# Patient Record
Sex: FEMALE | Race: WHITE | Employment: OTHER | URBAN - METROPOLITAN AREA
[De-identification: names, ages, dates, MRNs, and addresses within clinical notes are randomized per-mention and may not be internally consistent; named-entity substitution may affect disease eponyms.]

---

## 2017-01-13 ENCOUNTER — OFFICE VISIT (OUTPATIENT)
Dept: CARDIOLOGY CLINIC | Age: 77
End: 2017-01-13

## 2017-01-13 VITALS
HEIGHT: 64 IN | SYSTOLIC BLOOD PRESSURE: 130 MMHG | HEART RATE: 73 BPM | BODY MASS INDEX: 21.17 KG/M2 | DIASTOLIC BLOOD PRESSURE: 62 MMHG | WEIGHT: 124 LBS | OXYGEN SATURATION: 96 %

## 2017-01-13 DIAGNOSIS — Z95.2 S/P MVR (MITRAL VALVE REPLACEMENT): ICD-10-CM

## 2017-01-13 DIAGNOSIS — I50.32 DIASTOLIC CHF, CHRONIC (HCC): ICD-10-CM

## 2017-01-13 DIAGNOSIS — I10 ESSENTIAL HYPERTENSION: ICD-10-CM

## 2017-01-13 DIAGNOSIS — I35.1 AORTIC VALVE INSUFFICIENCY, UNSPECIFIED ETIOLOGY: ICD-10-CM

## 2017-01-13 DIAGNOSIS — I09.9 CHRONIC RHEUMATIC HEART DISEASE: ICD-10-CM

## 2017-01-13 DIAGNOSIS — I44.2 COMPLETE HEART BLOCK (HCC): ICD-10-CM

## 2017-01-13 DIAGNOSIS — I48.0 PAROXYSMAL ATRIAL FIBRILLATION (HCC): Primary | ICD-10-CM

## 2017-01-13 DIAGNOSIS — I08.0 MITRAL VALVE STENOSIS AND AORTIC VALVE INSUFFICIENCY: ICD-10-CM

## 2017-01-13 RX ORDER — BUMETANIDE 2 MG/1
2 TABLET ORAL 2 TIMES DAILY
Qty: 180 TAB | Refills: 3 | Status: SHIPPED | OUTPATIENT
Start: 2017-01-13 | End: 2017-12-08 | Stop reason: ALTCHOICE

## 2017-01-13 RX ORDER — BUMETANIDE 2 MG/1
2 TABLET ORAL 2 TIMES DAILY
COMMUNITY
End: 2017-01-13 | Stop reason: SDUPTHER

## 2017-01-13 RX ORDER — WARFARIN 6 MG/1
6 TABLET ORAL DAILY
Qty: 135 TAB | Refills: 3 | Status: SHIPPED | OUTPATIENT
Start: 2017-01-13

## 2017-01-13 NOTE — PROGRESS NOTES
1. Have you been to the ER, urgent care clinic since your last visit? Hospitalized since your last visit? No     2. Have you seen or consulted any other health care providers outside of the [facility] since your last visit? Include any pap smears or colon screening.  No

## 2017-01-13 NOTE — PROGRESS NOTES
HISTORY OF PRESENT ILLNESS  Jaquelin Cardozo is a 78 y.o. female. Hypertension   Associated symptoms include shortness of breath. Pertinent negatives include no chest pain, no abdominal pain and no headaches. Shortness of Breath   Pertinent negatives include no fever, no headaches, no cough, no wheezing, no PND, no orthopnea, no chest pain, no vomiting, no abdominal pain, no rash, no leg swelling and no claudication. Patient presents for a follow-up office visit. Patient has a past medical history significant for chronic rheumatic heart disease, status post 2 prior [employer] mechanical prosthetic mitral valve replacements, most recently in 2003. During her last surgery, and non-coronary cusp of her aortic valve was inadvertently sutured open resulting in moderate to severe aortic insufficiency which has been present for the past 10 years or more. She also has a history of complete heart block following her first valve replacement, resulting in a dual-chamber permanent pacemaker. Through the years the patient has had paroxysmal atrial fibrillation, and remains on warfarin for anticoagulation for both her mechanical valve and the arrhythmia. Patient last underwent a followup echocardiogram in November 2016 which demonstrated low-normal LV systolic function, EF 39%, a normal functioning mechanical mitral valve prosthesis, moderate to severe aortic insufficiency and mild pulmonary hypertension. This was not significantly changed compared to her previous echocardiogram.    The patient was last seen in the office 6 weeks ago. At last visit, she was found to be in decompensated heart failure, and she was also in atrial fibrillation at that time albeit rate controlled. She was switched from Lasix 80 mg daily to Bumex 2 mg twice daily which did increase her diuresis which led to improve leg swelling and a 9 pound weight loss.   She reports that her shortness of breath has improved, although she still gets quite winded with any exertional activity. He denies any orthopnea or PND. No further leg swelling. She was also found to have a low potassium level with a K of 2.9, so was started on potassium replacement. Past Medical History   Diagnosis Date    AI (aortic insufficiency)      Moderate to severe    Atrial fibrillation (HCC)      on Coumadin    Cardiac echocardiogram 11/08/2016     EF 50%. No WMA. Mild LVH. Gr 2 DDfx. Mild RVE. RVSP 43 mmHg. Severe LAE. Mild ARTI. Prosthetic MV w/normal fx. Mod-severe AI (mean grad 16 mmHg; prev 9 mmHg). Mod TR.  Cardiac Holter monitoring 09/29/2011     Baseline sinus rhythm w/ventricular tracking. Occasional premature ventricular ectopic beats likely correspond w/symptoms.  Complete heart block (HCC)     COPD (chronic obstructive pulmonary disease) ([facility] [address]     HTN (hypertension)     Long term (current) use of anticoagulants 8/19/2011    Mitral valve stenosis and aortic valve insufficiency      status post 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  Pacemaker      Medtronic dual-chamber       Current Outpatient Prescriptions   Medication Sig Dispense Refill    bumetanide (BUMEX) 2 mg tablet Take 1 Tab by mouth two (2) times a day. 180 Tab 3    warfarin (COUMADIN) 6 mg tablet Take 1 Tab by mouth daily. or as directed 135 Tab 3    potassium chloride SR (K-TAB) 20 mEq tablet Take 2 Tabs by mouth two (2) times a day. 360 Tab 3    metoprolol succinate (TOPROL XL) 100 mg tablet Take 1 Tab by mouth daily. 90 Tab 3    amLODIPine (NORVASC) 5 mg tablet Take 1 Tab by mouth daily. 90 Tab 3    albuterol (PROVENTIL HFA) 90 mcg/actuation inhaler Take 1 puff by inhalation every four (4) hours as needed. 3 Inhaler 3    aspirin delayed-release 81 mg tablet Take 1 Tab by mouth daily. 90 Tab 3    vitamin E (AQUA GEMS) 400 unit capsule Take 400 Units by mouth daily. No Known Allergies     Social History   Substance Use Topics    Smoking status: Former Smoker     Packs/day: 0.50     Years: 30.00     Quit date: 8/19/1999    Smokeless tobacco: Never Used    Alcohol use No             Review of Systems   Constitutional: Positive for malaise/fatigue. Negative for chills, fever and weight loss. HENT: Negative for nosebleeds. Eyes: Negative for blurred vision and double vision. Respiratory: Positive for shortness of breath. Negative for cough and wheezing. Cardiovascular: Negative for chest pain, palpitations, orthopnea, claudication, leg swelling and PND. Gastrointestinal: Negative for abdominal pain, heartburn, nausea and vomiting. Genitourinary: Negative for dysuria and hematuria. Musculoskeletal: Negative for falls and myalgias. Skin: Negative for rash. Neurological: Negative for dizziness, focal weakness and headaches. Endo/Heme/Allergies: Does not bruise/bleed easily. Psychiatric/Behavioral: Negative for substance abuse. Visit Vitals    /62    Pulse 73    Ht 5' 4\" (1.626 m)    Wt 56.2 kg (124 lb)    SpO2 96%    BMI 21.28 kg/m2      Physical Exam   Constitutional: She is oriented to person, place, and time. She appears well-developed and well-nourished. HENT:   Head: Normocephalic and atraumatic. Eyes: Conjunctivae are normal.   Neck: Neck supple. No JVD present. Carotid bruit is not present. Cardiovascular: Normal rate, regular rhythm, S1 normal, S2 normal and normal pulses. Exam reveals no gallop. Murmur heard. Midsystolic murmur is present with a grade of 2/6  at the upper right sternal border  High-pitched blowing decrescendo early diastolic murmur is present with a grade of 2/6  at the upper right sternal border radiating to the apex  Pulmonary/Chest: Effort normal. She has decreased breath sounds. She has no wheezes. She has no rales. Abdominal: Soft. Bowel sounds are normal. There is no tenderness. Musculoskeletal: She exhibits no edema. Neurological: She is alert and oriented to person, place, and time. Skin: Skin is warm and dry. EKG:  Atrial fibrillation, 100% ventricular pacing. Compared to the previous EKG, no significant interval change. Pacemaker interrogation. Battery at beginning of life. Estimated longevity at 7 years. Patient in the ventricle 96 %, persistent atrial fibrillation since November 27, 2016. No high rate episodes. Scanned document for details. ASSESSMENT and PLAN    Chronic diastolic heart failure. Patient's volume status appears to be much better today. Her weight is down 9 pounds. Her leg swelling has resolved. I would continue her current diuretic regimen which includes Bumex 2 mg twice daily. A repeat Chem-7 panel and magnesium level be checked. Her blood pressure appears to be optimally controlled on the current medical regimen. Rheumatic valvular heart disease. Status post mechanical [facility]'s, prosthetic mitral valve replacement x 2, the last surgery in 2003. She also has residual aortic valve disease with mild stenosis and moderate to severe aortic insufficiency. A repeat echocardiogram in November 2016 demonstrated a normal functioning mitral valve mechanical prosthesis and moderate to severe aortic insufficiency. EF 50%. This was essentially unchanged compared to the year prior she remains on warfarin for anticoagulation. Her goal INR is 3.0. Paroxysmal atrial fibrillation. The patient remains in atrial fibrillation for the past 6-7 weeks, and I suspect this is contributing to her diastolic heart failure. She has never had any high rate episodes. She is relatively asymptomatic to the arrhythmias. She is on therapeutic warfarin. Given her enlarged left atrium and prior mitral valve surgery, is unlikely that she will remain in sinus rhythm much in the future. Complete heart block. This occurs on her initial valve surgery.   She is now pacemaker dependent in the ventricle. Her current pacemaker generator is at beginning of life. Normal device function on interrogation today. Hypertension. Patient blood pressure is well controlled on her regimen which includes metoprolol and amlodipine. Dyslipidemia. Her most recent lipid panel from March 2015 showed an elevated total cholesterol at 230, , HDL 50. She would likely benefit from starting on a statin. The patient is reluctant to start a statin at this time, recommended repeat lipid panel in of her LDL remains greater than 140, I would recommend starting atorvastatin 20 mg daily. follow-up in 3 months, sooner if needed.

## 2017-01-13 NOTE — MR AVS SNAPSHOT
Visit Information Date & Time Provider Department Dept. Phone Encounter #  
 1/13/2017 11:20 AM Elisabeth Bourgeois MD [facility] [address] [id number] Upcoming Health Maintenance Date Due DTaP/Tdap/Td series (1 - Tdap) 1/2/1963 FOBT Q 1 YEAR AGE 50-75 1/2/1992 ZOSTER VACCINE AGE 60> 1/2/2002 GLAUCOMA SCREENING Q2Y 1/2/2007 OSTEOPOROSIS SCREENING (DEXA) 1/2/2007 Pneumococcal 65+ Low/Medium Risk (1 of 2 - PCV13) 1/2/2007 MEDICARE YEARLY EXAM 1/2/2007 INFLUENZA AGE 9 TO ADULT 8/1/2016 Allergies as of 1/13/2017  Review Complete On: 11/30/2016 By: Elisabeth Bourgeois MD  
 No Known Allergies Current Immunizations  Never Reviewed No immunizations on file. Not reviewed this visit You Were Diagnosed With   
  
 Codes Comments Complete heart block (HCC)    -  Primary ICD-10-CM: G10.8 ICD-9-CM: 426.0 Aortic valve insufficiency, unspecified etiology     ICD-10-CM: I35.1 ICD-9-CM: 424.1 Essential hypertension     ICD-10-CM: I10 
ICD-9-CM: 401.9 Paroxysmal atrial fibrillation (HCC)     ICD-10-CM: I48.0 ICD-9-CM: 427.31 Mitral valve stenosis and aortic valve insufficiency     ICD-10-CM: I08.0 ICD-9-CM: 396.1 Vitals BP Pulse Height(growth percentile) Weight(growth percentile) SpO2 BMI  
 130/62 73 5' 4\" (1.626 m) 124 lb (56.2 kg) 96% 21.28 kg/m2 OB Status Smoking Status Postmenopausal Former Smoker Vitals History BMI and BSA Data Body Mass Index Body Surface Area  
 21.28 kg/m 2 1.59 m 2 [facility] [address] [phone] Your Updated Medication List  
  
   
This list is accurate as of: 1/13/17 12:38 PM.  Always use your most recent med list.  
  
  
  
  
 albuterol 90 mcg/actuation inhaler Commonly known as:  PROVENTIL HFA  
 Take 1 puff by inhalation every four (4) hours as needed. amLODIPine 5 mg tablet Commonly known as:  Lorraine Bain Take 1 Tab by mouth daily. aspirin delayed-release 81 mg tablet Take 1 Tab by mouth daily. bumetanide 2 mg tablet Commonly known as:  Marcheta Mocha Take 1 Tab by mouth two (2) times a day. metoprolol succinate 100 mg tablet Commonly known as:  TOPROL XL Take 1 Tab by mouth daily. potassium chloride SR 20 mEq tablet Commonly known as:  K-TAB Take 2 Tabs by mouth two (2) times a day. vitamin E 400 unit capsule Commonly known as:  [address] Take 400 Units by mouth daily. warfarin 6 mg tablet Commonly known as:  COUMADIN Take 1 Tab by mouth daily. or as directed Prescriptions Printed Refills  
 bumetanide (BUMEX) 2 mg tablet 3 Sig: Take 1 Tab by mouth two (2) times a day. Class: Print Route: Oral  
  
We Performed the Following AMB POC EKG ROUTINE W/ 12 LEADS, INTER & REP [02595 CPT(R)] To-Do List   
 01/13/2017 Lab:  MAGNESIUM   
  
 01/13/2017 Lab:  METABOLIC PANEL, BASIC Introducing [facility] SERVICES! Dear Killian Santos: 
Thank you for requesting a [employer] account. Our records indicate that you already have an active [employer] account. You can access your account anytime at [email or web address]". [employer]/"[employer]" Did you know that you can access your hospital and ER discharge instructions at any time in [employer]? You can also review all of your test results from your hospital stay or ER visit. Additional Information If you have questions, please visit the Frequently Asked Questions section of the [employer] website at [email or web address]/"[employer]"/. Remember, [employer] is NOT to be used for urgent needs. For medical emergencies, dial 911. Now available from your iPhone and Android! Please provide this summary of care documentation to your next provider. Your primary care clinician is listed as NONE. If you have any questions after today's visit, please call [phone].

## 2017-01-30 LAB — INR, EXTERNAL: 2.4

## 2017-01-31 ENCOUNTER — TELEPHONE ANTICOAG (OUTPATIENT)
Dept: CARDIOLOGY CLINIC | Age: 77
End: 2017-01-31

## 2017-01-31 DIAGNOSIS — I08.0 MITRAL VALVE STENOSIS AND AORTIC VALVE INSUFFICIENCY: ICD-10-CM

## 2017-01-31 DIAGNOSIS — Z79.01 LONG TERM (CURRENT) USE OF ANTICOAGULANTS: ICD-10-CM

## 2017-01-31 NOTE — PROGRESS NOTES
Verbal order and read back per Stacey Tobias NP   The INR is below the therapeutic range.   Please make the following adjustments to Coumadin dosing: Take 6 mg X 2 then Coumadin to 6mg daily except 3mg on Mon, Wed, and Fri  Repeat the INR in 2 weeks  Patient informed of instructions, read back and verbalized understanding Pamela Diego LPN

## 2017-03-01 ENCOUNTER — TELEPHONE ANTICOAG (OUTPATIENT)
Dept: CARDIOLOGY CLINIC | Age: 77
End: 2017-03-01

## 2017-03-01 DIAGNOSIS — Z79.01 LONG TERM (CURRENT) USE OF ANTICOAGULANTS: ICD-10-CM

## 2017-03-01 DIAGNOSIS — I08.0 MITRAL VALVE STENOSIS AND AORTIC VALVE INSUFFICIENCY: ICD-10-CM

## 2017-03-01 LAB — INR, EXTERNAL: 1.3

## 2017-03-01 NOTE — PROGRESS NOTES
Verbal order and read back per Darrius Peterson NP   The INR is below the therapeutic range. Please make the following adjustments to Coumadin dosing: Take 9 mg X 1 then change Coumadin to 6mg daily except 3mg on Mon and Fri. Recheck in 1 week  Repeat the INR in 1 week.   Patient informed of instructions, read back and verbalized understanding Kaushik Valentin LPN

## 2017-03-21 ENCOUNTER — TELEPHONE ANTICOAG (OUTPATIENT)
Dept: CARDIOLOGY CLINIC | Age: 77
End: 2017-03-21

## 2017-03-21 DIAGNOSIS — I08.0 MITRAL VALVE STENOSIS AND AORTIC VALVE INSUFFICIENCY: ICD-10-CM

## 2017-03-21 DIAGNOSIS — Z79.01 LONG TERM (CURRENT) USE OF ANTICOAGULANTS: ICD-10-CM

## 2017-03-21 LAB — INR, EXTERNAL: 4.3

## 2017-03-21 NOTE — PROGRESS NOTES
Verbal order and read back per Fidencio Wei NP  The INR is above the therapeutic range. Ask the patient about bleeding complications. Please make the following adjustments to Coumadin dosing: Hold X 1 then change Coumadin to 6mg daily except 3mg on Mon, Wed, and Fri. Recheck in 1 week   Repeat the INR in 1 week.   Patient informed of instructions, read back and verbalized understanding Dianelys Moore LPN

## 2017-04-18 LAB — INR, EXTERNAL: 2.5

## 2017-04-19 ENCOUNTER — TELEPHONE ANTICOAG (OUTPATIENT)
Dept: CARDIOLOGY CLINIC | Age: 77
End: 2017-04-19

## 2017-04-19 DIAGNOSIS — Z79.01 LONG TERM (CURRENT) USE OF ANTICOAGULANTS: ICD-10-CM

## 2017-04-19 DIAGNOSIS — I08.0 MITRAL VALVE STENOSIS AND AORTIC VALVE INSUFFICIENCY: ICD-10-CM

## 2017-04-19 NOTE — PROGRESS NOTES
Verbal order and read back per Adrienne Riojas NP  The INR is stable and therapeutic. Continue same dose of coumadin and recheck in 1 month. Continue Coumadin 6mg daily except 3mg on Mon and Fri.  (Patient states she has been taking this dose)  Loan Noonan LPN

## 2017-05-15 LAB — INR, EXTERNAL: 2.2

## 2017-05-16 ENCOUNTER — TELEPHONE ANTICOAG (OUTPATIENT)
Dept: CARDIOLOGY CLINIC | Age: 77
End: 2017-05-16

## 2017-05-16 DIAGNOSIS — Z79.01 LONG TERM (CURRENT) USE OF ANTICOAGULANTS: ICD-10-CM

## 2017-05-16 DIAGNOSIS — I08.0 MITRAL VALVE STENOSIS AND AORTIC VALVE INSUFFICIENCY: ICD-10-CM

## 2017-05-16 NOTE — PROGRESS NOTES
Verbal order and read back per Tree Rader NP   The INR is below the therapeutic range. Please make the following adjustments to Coumadin dosing:Take 9 mg X 1 then continue Coumadin 6 mg daily except 3mg on Mon, Wed,  Fri. .  Repeat the INR in 2 weeks.   Patient informed of instructions, read back and verbalized understanding Maximo Evans LPN

## 2017-06-01 ENCOUNTER — OFFICE VISIT (OUTPATIENT)
Dept: CARDIOLOGY CLINIC | Age: 77
End: 2017-06-01

## 2017-06-01 VITALS
HEART RATE: 76 BPM | WEIGHT: 120 LBS | HEIGHT: 64 IN | SYSTOLIC BLOOD PRESSURE: 122 MMHG | BODY MASS INDEX: 20.49 KG/M2 | DIASTOLIC BLOOD PRESSURE: 60 MMHG | OXYGEN SATURATION: 98 %

## 2017-06-01 DIAGNOSIS — I44.2 COMPLETE HEART BLOCK (HCC): ICD-10-CM

## 2017-06-01 DIAGNOSIS — E78.5 DYSLIPIDEMIA: ICD-10-CM

## 2017-06-01 DIAGNOSIS — I50.32 DIASTOLIC CHF, CHRONIC (HCC): ICD-10-CM

## 2017-06-01 DIAGNOSIS — Z95.2 S/P MVR (MITRAL VALVE REPLACEMENT): ICD-10-CM

## 2017-06-01 DIAGNOSIS — I10 ESSENTIAL HYPERTENSION: ICD-10-CM

## 2017-06-01 DIAGNOSIS — I08.0 MITRAL VALVE STENOSIS AND AORTIC VALVE INSUFFICIENCY: Primary | ICD-10-CM

## 2017-06-01 DIAGNOSIS — Z79.01 LONG TERM (CURRENT) USE OF ANTICOAGULANTS: ICD-10-CM

## 2017-06-01 DIAGNOSIS — I48.0 PAROXYSMAL ATRIAL FIBRILLATION (HCC): ICD-10-CM

## 2017-06-01 NOTE — MR AVS SNAPSHOT
Visit Information Date & Time Provider Department Dept. Phone Encounter #  
 6/1/2017  1:00 PM Earl Hernandez MD [facility] [address] [id number] Upcoming Health Maintenance Date Due DTaP/Tdap/Td series (1 - Tdap) 1/2/1963 ZOSTER VACCINE AGE 60> 1/2/2002 GLAUCOMA SCREENING Q2Y 1/2/2007 OSTEOPOROSIS SCREENING (DEXA) 1/2/2007 Pneumococcal 65+ Low/Medium Risk (1 of 2 - PCV13) 1/2/2007 MEDICARE YEARLY EXAM 1/2/2007 INFLUENZA AGE 9 TO ADULT 8/1/2017 Allergies as of 6/1/2017  Review Complete On: 6/1/2017 By: Ivett Galindo No Known Allergies Current Immunizations  Never Reviewed No immunizations on file. Not reviewed this visit You Were Diagnosed With   
  
 Codes Comments Chronic systolic heart failure (HCC)    -  Primary ICD-10-CM: P90.27 ICD-9-CM: 428.22 Mitral valve stenosis and aortic valve insufficiency     ICD-10-CM: I08.0 ICD-9-CM: 396.1 Vitals BP Pulse Height(growth percentile) Weight(growth percentile) SpO2 BMI  
 122/60 76 5' 4\" (1.626 m) 120 lb (54.4 kg) 98% 20.6 kg/m2 OB Status Smoking Status Postmenopausal Former Smoker Vitals History BMI and BSA Data Body Mass Index Body Surface Area  
 20.6 kg/m 2 1.57 m 2 [facility] [address] [phone] Your Updated Medication List  
  
   
This list is accurate as of: 6/1/17  1:42 PM.  Always use your most recent med list.  
  
  
  
  
 albuterol 90 mcg/actuation inhaler Commonly known as:  PROVENTIL HFA Take 1 puff by inhalation every four (4) hours as needed. amLODIPine 5 mg tablet Commonly known as:  Giv[address] Hansel Take 1 Tab by mouth daily. aspirin delayed-release 81 mg tablet Take 1 Tab by mouth daily. bumetanide 2 mg tablet Commonly known as:  Dre Mitchell Take 1 Tab by mouth two (2) times a day. metoprolol succinate 100 mg tablet Commonly known as:  TOPROL XL Take 1 Tab by mouth daily. potassium chloride SR 20 mEq tablet Commonly known as:  K-TAB Take 2 Tabs by mouth two (2) times a day. vitamin E 400 unit capsule Commonly known as:  [address] Take 400 Units by mouth daily. warfarin 6 mg tablet Commonly known as:  COUMADIN Take 1 Tab by mouth daily. or as directed We Performed the Following AMB POC EKG ROUTINE W/ 12 LEADS, INTER & REP [11642 CPT(R)] To-Do List   
 11/01/2017 ECHO:  2D ECHO COMPLETE ADULT (TTE) W OR WO CONTR   
  
 11/07/2017 1:00 PM  
  Appointment with [facility] [address] [facility] CV SERV at [facility] NON-INVASIVE [address] [place] [address] ([phone]) Patient needs to bring a current list of all medications  No preparation is required for this study  This study requires patient to bring a written physicians order or the MD office may fax the order to Central Scheduling at [phone]. This exam is performed in the [address] [place] [address] at [facility] in the echo department. Please have the patient arrive 15 minutes prior to their schedule appointment time and report to Patient Registration at the main entrance of the hospital.     AGE LIMIT for this procedure at [facility] is 25years old. All patients under 25years of age should be referred to [facility]. Patient Instructions Increase coumadin to 6 mg daily except 3 mg daily Introducing [facility] HE[facility] SERVICES! Dear Andres Perez: 
Thank you for requesting a [employer] account. Our records indicate that you already have an active [employer] account. You can access your account anytime at [email or web address] Did you know that you can access your hospital and ER discharge instructions at any time in [employer]? You can also review all of your test results from your hospital stay or ER visit. Additional Information If you have questions, please visit the Frequently Asked Questions section of the [employer] website at [email or web address] Remember, [employer] is NOT to be used for urgent needs. For medical emergencies, dial 911. Now available from your iPhone and Android! Please provide this summary of care documentation to your next provider. Your primary care clinician is listed as NONE. If you have any questions after today's visit, please call [phone].

## 2017-06-01 NOTE — PROGRESS NOTES
HISTORY OF PRESENT ILLNESS  Moon Barron is a 78 y.o. female. Hypertension   Associated symptoms include shortness of breath. Pertinent negatives include no chest pain, no abdominal pain and no headaches. Shortness of Breath   Pertinent negatives include no fever, no headaches, no cough, no wheezing, no PND, no orthopnea, no chest pain, no vomiting, no abdominal pain, no rash, no leg swelling and no claudication. Patient presents for a follow-up office visit. Patient has a past medical history significant for chronic rheumatic heart disease, status post 2 prior [employer] mechanical prosthetic mitral valve replacements, most recently in 2003. During her last surgery, and non-coronary cusp of her aortic valve was inadvertently sutured open resulting in moderate to severe aortic insufficiency which has been present for the past 10 years or more. She also has a history of complete heart block following her first valve replacement, resulting in a dual-chamber permanent pacemaker. Through the years the patient has had paroxysmal atrial fibrillation, and remains on warfarin for anticoagulation for both her mechanical valve and the arrhythmia. Patient last underwent a followup echocardiogram in November 2016 which demonstrated low-normal LV systolic function, EF 78%, a normal functioning mechanical mitral valve prosthesis, moderate to severe aortic insufficiency and mild pulmonary hypertension. This was not significantly changed compared to her previous echocardiogram.    At last visit, the patient was switched from Lasix to Bumex which did help with her volume overload and heart failure symptoms. She was last seen in the office approximately 4-5 months ago. Since that time, she states she lost another 4-5 pounds in weight. Her shortness of breath has improved as has her activity tolerance. She denies any leg swelling, no orthopnea, no PND. No palpitations, dizziness or dutch syncope.   She checks her INR at home the last 2 readings have been 2.2 and 2.1 respectively over the past 2 weeks. Past Medical History:   Diagnosis Date    AI (aortic insufficiency)     Moderate to severe    Atrial fibrillation (HCC)     on Coumadin    Cardiac echocardiogram 11/08/2016    EF 50%. No WMA. Mild LVH. Gr 2 DDfx. Mild RVE. RVSP 43 mmHg. Severe LAE. Mild ARTI. Prosthetic MV w/normal fx. Mod-severe AI (mean grad 16 mmHg; prev 9 mmHg). Mod TR.  Cardiac Holter monitoring 09/29/2011    Baseline sinus rhythm w/ventricular tracking. Occasional premature ventricular ectopic beats likely correspond w/symptoms.  Complete heart block (HCC)     COPD (chronic obstructive pulmonary disease) ([facility] [address]     HTN (hypertension)     Long term (current) use of anticoagulants 8/19/2011    Mitral valve stenosis and aortic valve insufficiency     status post 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  Pacemaker     Medtronic dual-chamber       Current Outpatient Prescriptions   Medication Sig Dispense Refill    bumetanide (BUMEX) 2 mg tablet Take 1 Tab by mouth two (2) times a day. 180 Tab 3    warfarin (COUMADIN) 6 mg tablet Take 1 Tab by mouth daily. or as directed 135 Tab 3    potassium chloride SR (K-TAB) 20 mEq tablet Take 2 Tabs by mouth two (2) times a day. 360 Tab 3    metoprolol succinate (TOPROL XL) 100 mg tablet Take 1 Tab by mouth daily. 90 Tab 3    amLODIPine (NORVASC) 5 mg tablet Take 1 Tab by mouth daily. 90 Tab 3    albuterol (PROVENTIL HFA) 90 mcg/actuation inhaler Take 1 puff by inhalation every four (4) hours as needed. 3 Inhaler 3    aspirin delayed-release 81 mg tablet Take 1 Tab by mouth daily. 90 Tab 3    vitamin E (AQUA GEMS) 400 unit capsule Take 400 Units by mouth daily.          No Known Allergies     Social History   Substance Use Topics    Smoking status: Former Smoker     Packs/day: 0.50 Years: 30.00     Quit date: 8/19/1999    Smokeless tobacco: Never Used    Alcohol use No             Review of Systems   Constitutional: Negative for chills, fever and weight loss. HENT: Negative for nosebleeds. Eyes: Negative for blurred vision and double vision. Respiratory: Positive for shortness of breath. Negative for cough and wheezing. Cardiovascular: Negative for chest pain, palpitations, orthopnea, claudication, leg swelling and PND. Gastrointestinal: Negative for abdominal pain, heartburn, nausea and vomiting. Genitourinary: Negative for dysuria and hematuria. Musculoskeletal: Negative for falls and myalgias. Skin: Negative for rash. Neurological: Negative for dizziness, focal weakness and headaches. Endo/Heme/Allergies: Does not bruise/bleed easily. Psychiatric/Behavioral: Negative for substance abuse. Visit Vitals    /60    Pulse 76    Ht 5' 4\" (1.626 m)    Wt 54.4 kg (120 lb)    SpO2 98%    BMI 20.6 kg/m2      Physical Exam   Constitutional: She is oriented to person, place, and time. She appears well-developed and well-nourished. HENT:   Head: Normocephalic and atraumatic. Eyes: Conjunctivae are normal.   Neck: Neck supple. No JVD present. Carotid bruit is not present. Cardiovascular: Normal rate, regular rhythm, S1 normal, S2 normal and normal pulses. Exam reveals no gallop. Murmur heard. Midsystolic murmur is present with a grade of 2/6  at the upper right sternal border  High-pitched blowing decrescendo early diastolic murmur is present with a grade of 2/6  at the upper right sternal border radiating to the apex  Pulmonary/Chest: Effort normal. She has decreased breath sounds. She has no wheezes. She has no rales. Abdominal: Soft. Bowel sounds are normal. There is no tenderness. Musculoskeletal: She exhibits no edema. Neurological: She is alert and oriented to person, place, and time. Skin: Skin is warm and dry.      EKG:  Atrial fibrillation, 100% ventricular pacing. Compared to the previous EKG, no significant interval change. Pacemaker interrogation. Battery at beginning of life. Estimated longevity at 6 years. Patient in the ventricle 97 %, persistent atrial fibrillation since November 2016. No high rate episodes. Scanned document for details. ASSESSMENT and PLAN    Chronic diastolic heart failure. Patient's volume status remained stable. Her weight is down another 4-5 pounds today. I would continue her current diuretic regimen which includes Bumex 2 mg twice daily. Her blood pressure remains optimally controlled on the current medical regimen. Rheumatic valvular heart disease. Status post mechanical [facility], prosthetic mitral valve replacement x 2, the last surgery in 2003. She also has residual aortic valve disease with mild stenosis and moderate to severe aortic insufficiency. A repeat echocardiogram in November 2016 demonstrated a normal functioning mitral valve mechanical prosthesis and moderate to severe aortic insufficiency. EF 50%. This was essentially unchanged compared to the year prior she remains on warfarin for anticoagulation. Her goal INR is 3.0. I would like to adjust her warfarin dosing since her last 2 INR readings have been subtherapeutic. I recommended that she take warfarin 6 mg 6 days a week except for Wednesday when she should take 3 mg. She should check a follow-up INR in 1 week and call our office with the results. Paroxysmal atrial fibrillation. The patient has remained in atrial fibrillation for the past 6-7 months she has never had any high rate episodes. She is relatively asymptomatic to the arrhythmias. She is on therapeutic warfarin. Given her enlarged left atrium and prior mitral valve surgery, is unlikely that she will remain in sinus rhythm much in the future. For now I would continue her current beta-blocker dosage. Complete heart block.   This occurs on her initial valve surgery. She is now pacemaker dependent in the ventricle. Her current pacemaker generator is at beginning of life. Normal device function on interrogation today. Hypertension. Patient blood pressure is well controlled on her regimen which includes metoprolol and amlodipine. Dyslipidemia. Her most recent lipid panel from March 2015 showed an elevated total cholesterol at 230, , HDL 50. She would likely benefit from starting on a statin. The patient is reluctant to start a statin at this time. follow-up in 6 months, sooner if needed.

## 2017-06-01 NOTE — PROGRESS NOTES
1. Have you been to the ER, urgent care clinic since your last visit? Hospitalized since your last visit? no  2. Have you seen or consulted any other health care providers outside of the [address] [place] [address] since your last visit? Include any pap smears or colon screening.   no

## 2017-06-14 ENCOUNTER — TELEPHONE ANTICOAG (OUTPATIENT)
Dept: CARDIOLOGY CLINIC | Age: 77
End: 2017-06-14

## 2017-06-14 DIAGNOSIS — I08.0 MITRAL VALVE STENOSIS AND AORTIC VALVE INSUFFICIENCY: ICD-10-CM

## 2017-06-14 DIAGNOSIS — Z79.01 LONG TERM (CURRENT) USE OF ANTICOAGULANTS: ICD-10-CM

## 2017-06-14 LAB — INR, EXTERNAL: 1.4

## 2017-06-14 NOTE — PROGRESS NOTES
Verbal order and read back per Kartik Partida NP  The INR is below the therapeutic range. Please make the following adjustments to Coumadin dosing: Take 9 mg X 1 then increase Coumadin to 6 mg daily  (Patient states she was taking 6 mg daily except 3 mg on Wed)  Repeat the INR in 1 week.   Patient informed of instructions, read back and verbalized understanding Nilesh Parnell LPN

## 2017-06-21 LAB — INR, EXTERNAL: 2.3

## 2017-06-22 ENCOUNTER — TELEPHONE ANTICOAG (OUTPATIENT)
Dept: CARDIOLOGY CLINIC | Age: 77
End: 2017-06-22

## 2017-06-22 DIAGNOSIS — I08.0 MITRAL VALVE STENOSIS AND AORTIC VALVE INSUFFICIENCY: ICD-10-CM

## 2017-06-22 DIAGNOSIS — Z79.01 LONG TERM (CURRENT) USE OF ANTICOAGULANTS: ICD-10-CM

## 2017-06-22 RX ORDER — AMLODIPINE BESYLATE 5 MG/1
5 TABLET ORAL DAILY
Qty: 90 TAB | Refills: 3 | Status: SHIPPED | OUTPATIENT
Start: 2017-06-22

## 2017-06-22 NOTE — PROGRESS NOTES
Verbal order and read back per Ramirez No, NP  The INR is below the therapeutic range. Please make the following adjustments to Coumadin dosing: Take 9 mg X 1 then continue Coumadin 6 mg daily   Repeat the INR in 1 week.   Patient informed of instructions, read back and verbalized understanding Alpa Olivas LPN

## 2017-07-03 LAB — INR, EXTERNAL: 4.7

## 2017-07-05 ENCOUNTER — TELEPHONE ANTICOAG (OUTPATIENT)
Dept: CARDIOLOGY CLINIC | Age: 77
End: 2017-07-05

## 2017-07-05 DIAGNOSIS — I08.0 MITRAL VALVE STENOSIS AND AORTIC VALVE INSUFFICIENCY: ICD-10-CM

## 2017-07-05 DIAGNOSIS — Z79.01 LONG TERM (CURRENT) USE OF ANTICOAGULANTS: ICD-10-CM

## 2017-07-05 NOTE — PROGRESS NOTES
Verbal order and read back per Radha Current, NP  The INR is above the therapeutic range. Ask the patient about bleeding complications. Please make the following adjustments to Coumadin dosing: Hold X 1 then continue Coumadin 6 mg daily.  Recheck on Monday  Patient informed of instructions, read back and verbalized understanding Rian Jo LPN

## 2017-07-11 LAB — INR, EXTERNAL: 2.5

## 2017-07-13 ENCOUNTER — TELEPHONE ANTICOAG (OUTPATIENT)
Dept: CARDIOLOGY CLINIC | Age: 77
End: 2017-07-13

## 2017-07-13 DIAGNOSIS — Z79.01 LONG TERM (CURRENT) USE OF ANTICOAGULANTS: ICD-10-CM

## 2017-07-13 DIAGNOSIS — I08.0 MITRAL VALVE STENOSIS AND AORTIC VALVE INSUFFICIENCY: ICD-10-CM

## 2017-07-13 NOTE — PROGRESS NOTES
Verbal order and read back per Naima Dupont MD  The INR is stable and therapeutic. Continue same dose of coumadin and recheck in 1 week  Continue Coumadin 6 mg daily.    Patient informed of instructions, read back and verbalized understanding Karthikeyan Forutne LPN

## 2017-07-20 ENCOUNTER — TELEPHONE ANTICOAG (OUTPATIENT)
Dept: CARDIOLOGY CLINIC | Age: 77
End: 2017-07-20

## 2017-07-20 DIAGNOSIS — Z79.01 LONG TERM (CURRENT) USE OF ANTICOAGULANTS: ICD-10-CM

## 2017-07-20 DIAGNOSIS — I08.0 MITRAL VALVE STENOSIS AND AORTIC VALVE INSUFFICIENCY: ICD-10-CM

## 2017-07-20 LAB — INR, EXTERNAL: 2.7

## 2017-07-20 NOTE — PROGRESS NOTES
Verbal order and read back per Betzaida Campbell NP  The INR is stable and therapeutic. Continue same dose of coumadin and recheck in 2 weeks  Continue Coumadin 6 mg daily.    Patient informed of instructions, read back and verbalized understanding Octavia Tejada LPN

## 2017-08-09 LAB — INR, EXTERNAL: 3

## 2017-08-10 ENCOUNTER — TELEPHONE ANTICOAG (OUTPATIENT)
Dept: CARDIOLOGY CLINIC | Age: 77
End: 2017-08-10

## 2017-08-10 DIAGNOSIS — I08.0 MITRAL VALVE STENOSIS AND AORTIC VALVE INSUFFICIENCY: ICD-10-CM

## 2017-08-10 DIAGNOSIS — Z79.01 LONG TERM (CURRENT) USE OF ANTICOAGULANTS: ICD-10-CM

## 2017-08-10 NOTE — PROGRESS NOTES
Verbal order and read back per Karmen Junior, NP  The INR is stable and therapeutic. Continue same dose of coumadin and recheck in 3 weeks  Continue Coumadin 6 mg daily.    Patient informed of instructions, read back and verbalized understanding Alondra Trammell LPN

## 2017-09-05 LAB — INR, EXTERNAL: 3.6

## 2017-09-06 ENCOUNTER — TELEPHONE ANTICOAG (OUTPATIENT)
Dept: CARDIOLOGY CLINIC | Age: 77
End: 2017-09-06

## 2017-09-06 DIAGNOSIS — I08.0 MITRAL VALVE STENOSIS AND AORTIC VALVE INSUFFICIENCY: ICD-10-CM

## 2017-09-06 DIAGNOSIS — Z79.01 LONG TERM (CURRENT) USE OF ANTICOAGULANTS: ICD-10-CM

## 2017-09-06 NOTE — PROGRESS NOTES
Verbal order and read back per Lesli Colon NP  The INR is above the therapeutic range. Ask the patient about bleeding complications. Please make the following adjustments to Coumadin dosing: Take 3 mg X 1 then Continue Coumadin 6 mg daily. Repeat the INR in 2 weeks.   Patient informed of instructions, read back and verbalized understanding Lou Cline LPN

## 2017-09-21 LAB — INR, EXTERNAL: 3.7

## 2017-09-22 ENCOUNTER — TELEPHONE ANTICOAG (OUTPATIENT)
Dept: CARDIOLOGY CLINIC | Age: 77
End: 2017-09-22

## 2017-09-22 DIAGNOSIS — Z79.01 LONG TERM (CURRENT) USE OF ANTICOAGULANTS: ICD-10-CM

## 2017-09-22 DIAGNOSIS — I08.0 MITRAL VALVE STENOSIS AND AORTIC VALVE INSUFFICIENCY: ICD-10-CM

## 2017-09-22 NOTE — PROGRESS NOTES
Verbal order and read back per Susanne Dee NP  The INR is above the therapeutic range. Ask the patient about bleeding complications. Please make the following adjustments to Coumadin dosing:Take 3 mg X 1 then decrease Coumadin to 6 mg daily except 3 mg on  Monday  Repeat the INR in 2 weeks.   Patient informed of instructions, read back and verbalized understanding Arnaud Rodriguez LPN

## 2017-10-11 LAB — INR, EXTERNAL: 2.7

## 2017-10-16 ENCOUNTER — TELEPHONE ANTICOAG (OUTPATIENT)
Dept: CARDIOLOGY CLINIC | Age: 77
End: 2017-10-16

## 2017-10-16 DIAGNOSIS — I08.0 MITRAL VALVE STENOSIS AND AORTIC VALVE INSUFFICIENCY: ICD-10-CM

## 2017-10-16 NOTE — PROGRESS NOTES
Verbal order and read back per Clari Ash NP  The INR is stable and therapeutic.  Continue same dose of coumadin and recheck in 2 weeks  Continue Coumadin 6 mg daily except 3 mg on  Monday  Patient informed of instructions, read back and verbalized understanding Mounika Weiss, LPN

## 2017-11-03 LAB — INR, EXTERNAL: 3.9

## 2017-11-07 ENCOUNTER — TELEPHONE ANTICOAG (OUTPATIENT)
Dept: CARDIOLOGY CLINIC | Age: 77
End: 2017-11-07

## 2017-11-07 ENCOUNTER — HOSPITAL ENCOUNTER (OUTPATIENT)
Dept: NON INVASIVE DIAGNOSTICS | Age: 77
Discharge: HOME | End: 2017-11-07
Attending: INTERNAL MEDICINE
Payer: MEDICARE

## 2017-11-07 DIAGNOSIS — I08.0 MITRAL VALVE STENOSIS AND AORTIC VALVE INSUFFICIENCY: ICD-10-CM

## 2017-11-07 PROCEDURE — 93306 TTE W/DOPPLER COMPLETE: CPT

## 2017-11-07 NOTE — PROGRESS NOTES
The INR is above the therapeutic range. Ask the patient about bleeding complications. Please make the following adjustments to Coumadin dosing: Hold x 1, then continue Coumadin 6 mg daily except 3 mg on  Monday. Repeat the INR in 2 weeks.       Verbal order and read back per Smooth Jones NP

## 2017-11-08 ENCOUNTER — TELEPHONE (OUTPATIENT)
Dept: CARDIOLOGY CLINIC | Age: 77
End: 2017-11-08

## 2017-11-08 NOTE — TELEPHONE ENCOUNTER
Called patient. Verified  and full name. Informed about results per Dr Pablo Brown. Patient reports feeling more tired and more shortness of breath. I have scheduled them 2017 @ 100 PM.  Patient verbalized understanding and agreed with the plan of care.

## 2017-11-08 NOTE — TELEPHONE ENCOUNTER
----- Message from Xochitl Gutierrez MD sent at 11/8/2017  4:41 PM EST -----  Echocardiogram reviewed. Her heart function has decreased compared to her prior study. Her valve replacement is functioning normally, I would like to call her and ensure she is not having any new symptoms of shortness of breath. If she has, I would like to see her sooner. If she remains asymptomatic, she can see me as scheduled in 4 weeks  ----- Message -----     From: Katelin Hemp     Sent: 11/8/2017  10:32 AM       To: Xochitl Gutierrez MD       Rheumatic valvular heart disease. Status post mechanical [facility], prosthetic mitral valve replacement x 2, the last surgery in 2003. She also has residual aortic valve disease with mild stenosis and moderate to severe aortic insufficiency. A repeat echocardiogram in November 2016 demonstrated a normal functioning mitral valve mechanical prosthesis and moderate to severe aortic insufficiency. EF 50%. This was essentially unchanged compared to the year prior she remains on warfarin for anticoagulation. Her goal INR is 3.0. I would like to adjust her warfarin dosing since her last 2 INR readings have been subtherapeutic. I recommended that she take warfarin 6 mg 6 days a week except for Wednesday when she should take 3 mg. She should check a follow-up INR in 1 week and call our office with the results.

## 2017-11-08 NOTE — PROGRESS NOTES
Rheumatic valvular heart disease. Status post mechanical [facility], prosthetic mitral valve replacement x 2, the last surgery in 2003. She also has residual aortic valve disease with mild stenosis and moderate to severe aortic insufficiency. A repeat echocardiogram in November 2016 demonstrated a normal functioning mitral valve mechanical prosthesis and moderate to severe aortic insufficiency. EF 50%. This was essentially unchanged compared to the year prior she remains on warfarin for anticoagulation. Her goal INR is 3.0. I would like to adjust her warfarin dosing since her last 2 INR readings have been subtherapeutic. I recommended that she take warfarin 6 mg 6 days a week except for Wednesday when she should take 3 mg. She should check a follow-up INR in 1 week and call our office with the results.

## 2017-11-09 ENCOUNTER — OFFICE VISIT (OUTPATIENT)
Dept: CARDIOLOGY CLINIC | Age: 77
End: 2017-11-09

## 2017-11-09 VITALS
DIASTOLIC BLOOD PRESSURE: 60 MMHG | HEART RATE: 78 BPM | SYSTOLIC BLOOD PRESSURE: 132 MMHG | OXYGEN SATURATION: 97 % | WEIGHT: 124 LBS | HEIGHT: 64 IN | BODY MASS INDEX: 21.17 KG/M2

## 2017-11-09 DIAGNOSIS — I44.2 COMPLETE HEART BLOCK (HCC): ICD-10-CM

## 2017-11-09 DIAGNOSIS — I48.0 PAROXYSMAL ATRIAL FIBRILLATION (HCC): ICD-10-CM

## 2017-11-09 DIAGNOSIS — I35.1 NONRHEUMATIC AORTIC VALVE INSUFFICIENCY: ICD-10-CM

## 2017-11-09 DIAGNOSIS — I42.9 CARDIOMYOPATHY, UNSPECIFIED TYPE (HCC): ICD-10-CM

## 2017-11-09 DIAGNOSIS — Z95.2 S/P MVR (MITRAL VALVE REPLACEMENT): ICD-10-CM

## 2017-11-09 DIAGNOSIS — I50.41 ACUTE COMBINED SYSTOLIC AND DIASTOLIC HEART FAILURE (HCC): Primary | ICD-10-CM

## 2017-11-09 RX ORDER — LISINOPRIL 5 MG/1
5 TABLET ORAL DAILY
Qty: 30 TAB | Refills: 6 | Status: ON HOLD | OUTPATIENT
Start: 2017-11-09 | End: 2017-12-01

## 2017-11-09 NOTE — PROGRESS NOTES
HISTORY OF PRESENT ILLNESS  Steve Holguin is a 78 y.o. female. Shortness of Breath   Pertinent negatives include no fever, no headaches, no cough, no wheezing, no PND, no orthopnea, no chest pain, no vomiting, no abdominal pain, no rash, no leg swelling and no claudication. Hypertension   Associated symptoms include shortness of breath. Pertinent negatives include no chest pain, no abdominal pain and no headaches. Patient presents for a follow-up office visit. Patient has a past medical history significant for chronic rheumatic heart disease, status post 2 prior [facility] mechanical prosthetic mitral valve replacements, most recently in 2003. During her last surgery, and non-coronary cusp of her aortic valve was inadvertently sutured open resulting in moderate to severe aortic insufficiency which has been present for the past 10 years or more. She also has a history of complete heart block following her first valve replacement, resulting in a dual-chamber permanent pacemaker. Through the years the patient has had paroxysmal atrial fibrillation, and remains on warfarin for anticoagulation for both her mechanical valve and the arrhythmia. Patient recently underwent a follow-up echocardiogram in November 2017 which showed a decrease in her left ventricular ejection fraction down to 30-35%, where it had previously been 50% with akinesis of the inferior apex. She continued to have moderate to severe aortic insufficiency with a normal functioning mechanical mitral valve prosthesis. She was last seen in the office 5 months ago. Since last visit, she has had progressive worsening shortness of breath with any activity over the past 2-3 months. She denies any weight gain or leg swelling. No significant orthopnea or PND. She has been taking her diuretics regularly. She is now on Bumex 2 mg twice daily. She denies any palpitations.   She does complain of dizzy spells when she stands up too quickly but no dutch syncope or near syncope. Past Medical History:   Diagnosis Date    AI (aortic insufficiency)     Moderate to severe    Atrial fibrillation (HCC)     on Coumadin    Cardiac echocardiogram 11/08/2016    EF 50%. No WMA. Mild LVH. Gr 2 DDfx. Mild RVE. RVSP 43 mmHg. Severe LAE. Mild ARTI. Prosthetic MV w/normal fx. Mod-severe AI (mean grad 16 mmHg; prev 9 mmHg). Mod TR.  Cardiac Holter monitoring 09/29/2011    Baseline sinus rhythm w/ventricular tracking. Occasional premature ventricular ectopic beats likely correspond w/symptoms.  Complete heart block (HCC)     COPD (chronic obstructive pulmonary disease) ([facility] [address]     HTN (hypertension)     Long term (current) use of anticoagulants 8/19/2011    Mitral valve stenosis and aortic valve insufficiency     status post 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  Pacemaker     Medtronic dual-chamber       Current Outpatient Prescriptions   Medication Sig Dispense Refill    amLODIPine (NORVASC) 5 mg tablet Take 1 Tab by mouth daily. 90 Tab 3    bumetanide (BUMEX) 2 mg tablet Take 1 Tab by mouth two (2) times a day. 180 Tab 3    warfarin (COUMADIN) 6 mg tablet Take 1 Tab by mouth daily. or as directed 135 Tab 3    potassium chloride SR (K-TAB) 20 mEq tablet Take 2 Tabs by mouth two (2) times a day. (Patient taking differently: Take 20 mEq by mouth two (2) times a day.) 360 Tab 3    metoprolol succinate (TOPROL XL) 100 mg tablet Take 1 Tab by mouth daily. 90 Tab 3    albuterol (PROVENTIL HFA) 90 mcg/actuation inhaler Take 1 puff by inhalation every four (4) hours as needed. 3 Inhaler 3    aspirin delayed-release 81 mg tablet Take 1 Tab by mouth daily. 90 Tab 3    vitamin E (AQUA GEMS) 400 unit capsule Take 400 Units by mouth daily.          No Known Allergies     Social History   Substance Use Topics    Smoking status: Former Smoker Packs/day: 0.50     Years: 30.00     Quit date: 8/19/1999    Smokeless tobacco: Never Used    Alcohol use No         Family History   Problem Relation Age of Onset    Heart Failure Mother     Heart Failure Father          Review of Systems   Constitutional: Negative for chills, fever and weight loss. HENT: Negative for nosebleeds. Eyes: Negative for blurred vision and double vision. Respiratory: Positive for shortness of breath. Negative for cough and wheezing. Cardiovascular: Negative for chest pain, palpitations, orthopnea, claudication, leg swelling and PND. Gastrointestinal: Negative for abdominal pain, heartburn, nausea and vomiting. Genitourinary: Negative for dysuria and hematuria. Musculoskeletal: Negative for falls and myalgias. Skin: Negative for rash. Neurological: Positive for dizziness. Negative for focal weakness and headaches. Endo/Heme/Allergies: Does not bruise/bleed easily. Psychiatric/Behavioral: Negative for substance abuse. Visit Vitals    /60    Pulse 78    Ht 5' 4\" (1.626 m)    Wt 56.2 kg (124 lb)    SpO2 97%    BMI 21.28 kg/m2      Physical Exam   Constitutional: She is oriented to person, place, and time. She appears well-developed and well-nourished. HENT:   Head: Normocephalic and atraumatic. Eyes: Conjunctivae are normal.   Neck: Neck supple. No JVD present. Carotid bruit is not present. Cardiovascular: Normal rate, regular rhythm, S1 normal, S2 normal and normal pulses. Exam reveals no gallop. Murmur heard. Midsystolic murmur is present with a grade of 2/6  at the upper right sternal border  High-pitched blowing decrescendo early diastolic murmur is present with a grade of 2/6  at the upper right sternal border radiating to the apex  Pulmonary/Chest: Effort normal. She has decreased breath sounds. She has no wheezes. She has no rales. Abdominal: Soft. Bowel sounds are normal. There is no tenderness.    Musculoskeletal: She exhibits no edema, tenderness or deformity. Neurological: She is alert and oriented to person, place, and time. Skin: Skin is warm and dry. Psychiatric: She has a normal mood and affect. Her behavior is normal. Thought content normal.     EKG:  Atrial fibrillation, 100% ventricular pacing. Compared to the previous EKG, no significant interval change. Pacemaker interrogation. Battery at beginning of life. Estimated longevity at 5 years. Patient in the ventricle 73 %, persistent atrial fibrillation since last device check. No high rate episodes. Scanned document for details. ASSESSMENT and PLAN    Acute systolic and diastolic heart failure. Patient does have a history of chronic diastolic heart failure, however her ejection fraction is now reduced compared to baseline. Her EF was 30-35% on a recent echocardiogram earlier this month. She does have worsening symptoms of shortness of breath now with any activity. However, her volume status appears to be relatively stable. I recommended further evaluation with a right and left heart catheterization to assess her right-sided  hemodynamics, left ventricular filling pressure, and assess for any significant coronary artery disease. She will need to be bridged with  Lovenox when she holds her warfarin. I would also like to start her on a low-dose of an ACE inhibitor now that her ejection fraction has decreased. I will continue her current beta-blocker dosage and her scheduled diuretics. Rheumatic valvular heart disease. Status post mechanical [facility], prosthetic mitral valve replacement x 2, the last surgery in 2003. She also has residual aortic valve disease with mild stenosis and moderate to severe aortic insufficiency. A repeat echocardiogram in November 2017 demonstrated a normal functioning mitral valve mechanical prosthesis and moderate to severe aortic insufficiency. EF down to 30-35 %. Her goal INR is 3.0.   She will require bridging for her catheterization. Paroxysmal atrial fibrillation. The patient has remained in atrial fibrillation for the past 12 months she has never had any high rate episodes. She is relatively asymptomatic to the arrhythmias, although this may be contributing to her heart failure symptoms. It is highly unlikely that she will remain in sinus rhythm if a cardioversion is attempted. She will need to remain on anticoagulation indefinitely. Complete heart block. This occurs on her initial valve surgery. She is now pacemaker dependent in the ventricle. Her current pacemaker generator is at beginning of life. Normal device function on interrogation today. Hypertension. Patient blood pressure is well controlled on her regimen which includes metoprolol and amlodipine. Dyslipidemia. A lipid panel from March 2015 showed an elevated total cholesterol at 230, , HDL 50. She would likely benefit from starting on a statin. The patient has not wanted to start a statin in the past.     follow-up in 4-6 weeks following her procedure, sooner if needed.

## 2017-11-09 NOTE — PATIENT INSTRUCTIONS
Start Lisinopril 5 mg daily     Lisinopril (By mouth)   Lisinopril (lye-SIN-oh-pril)  Treats high blood pressure and heart failure. Also given to reduce the risk of death after a heart attack. This medicine is an ACE inhibitor. Brand Name(s): Prinivil, Qbrelis, Zestril   There may be other brand names for this medicine. When This Medicine Should Not Be Used: This medicine is not right for everyone. Do not use it if you had an allergic reaction to lisinopril or another ACE inhibitor, or if you are pregnant. How to Use This Medicine:   Liquid, Tablet  · Take your medicine as directed. Your dose may need to be changed several times to find what works best for you. · Oral liquid: Measure the oral liquid medicine with a marked measuring spoon, oral syringe, or medicine cup. · Missed dose: Take a dose as soon as you remember. If it is almost time for your next dose, wait until then and take a regular dose. Do not take extra medicine to make up for a missed dose. · Store the medicine in a closed container at room temperature, away from heat, moisture, and direct light. Drugs and Foods to Avoid:   Ask your doctor or pharmacist before using any other medicine, including over-the-counter medicines, vitamins, and herbal products. · Do not use this medicine together with aliskiren if you have diabetes. · Some foods and medicines may affect how lisinopril works. Tell your doctor if you are using any of the following:   ¨ Aliskiren, everolimus, lithium, sirolimus, temsirolimus  ¨ Another blood pressure medicine, including an angiotensin receptor blocker (ARB)  ¨ Diuretic (water pill, including amiloride, spironolactone, triamterene)  ¨ Insulin or diabetes medicine  ¨ NSAID pain or arthritis medicine (including aspirin, celecoxib, diclofenac, ibuprofen, naproxen)  · Ask your doctor before you use any medicine, supplement, or salt substitute that contains potassium.   Warnings While Using This Medicine:   · It is not safe to take this medicine during pregnancy. It could harm an unborn baby. Tell your doctor right away if you become pregnant. · Tell your doctor if you are breastfeeding, or if you have kidney disease, liver disease, diabetes, or heart or blood vessel disease. · This medicine may cause the following problems:  ¨ Angioedema (severe swelling)  ¨ Kidney problems  ¨ Serious liver problems  · This medicine could lower your blood pressure too much, especially when you first use it or if you are dehydrated. Stand or sit up slowly if you feel lightheaded or dizzy. · Do not stop using this medicine without asking your doctor, even if you feel well. This medicine will not cure your high blood pressure, but it will help keep it in a normal range. You may have to take blood pressure medicine for the rest of your life. · Tell any doctor or dentist who treats you that you are using this medicine. · Your doctor will do lab tests at regular visits to check on the effects of this medicine. Keep all appointments. · Keep all medicine out of the reach of children. Never share your medicine with anyone.   Possible Side Effects While Using This Medicine:   Call your doctor right away if you notice any of these side effects:  · Allergic reaction: Itching or hives, swelling in your face or hands, swelling or tingling in your mouth or throat, chest tightness, trouble breathing  · Blistering, peeling, or red skin rash  · Change in how much or how often you urinate  · Confusion, weakness, uneven heartbeat, trouble breathing, numbness or tingling in your hands, feet, or lips  · Dark urine or pale stools, nausea, vomiting, loss of appetite, stomach pain, yellow skin or eyes  · Fever, chills, sore throat, body aches  · Lightheadedness, dizziness, fainting  · Severe stomach pain (with or without nausea or vomiting)  If you notice these less serious side effects, talk with your doctor:   · Dry cough  If you notice other side effects that you think are caused by this medicine, tell your doctor. Call your doctor for medical advice about side effects. You may report side effects to FDA at 8-889-VIW-5658  © 2017 [facility] Information is for End User's use only and may not be sold, redistributed or otherwise used for commercial purposes. The above information is an  only. It is not intended as medical advice for individual conditions or treatments. Talk to your doctor, nurse or pharmacist before following any medical regimen to see if it is safe and effective for you.

## 2017-11-09 NOTE — PROGRESS NOTES
1. Have you been to the ER, urgent care clinic since your last visit? Hospitalized since your last visit? no  2. Have you seen or consulted any other health care providers outside of the [address] [place] [address] since your last visit? Include any pap smears or colon screening.  no

## 2017-11-09 NOTE — LETTER
2017 2:06 PM 
 
 
 
Dontrell Adams 
[id number] 
1940 Insurance:  Medicare/ For Life                  Auth #  No Auth Needed Proc Date:                 Proc Time:  8:00am 
 
Performing MD : Dr. Trey Rodney                      Procedure:R&L Cath Hospital:  [facility]                                            PCP None Listed Scheduled with:  Laura/Email                                                        Date:2017 HP:      EK/9   Labs:______  CXR: _______  Orders:   Special Instructions:  _____________________________________________________ 
______________________________________________________________________ 
______________________________________________________________________ Date Faxed:   ______________   Pages Faxed: ___________________ The materials enclosed with this facsimile transmission are private and confidential and are the property of the sender. If you are not the intended recipient, be advised that any unauthorized use, disclosure, copying, distribution, or the taking of any action in reliance on the contents of this telecopied information is strictly prohibited. If you have received this in error, please immediately notify the sender via telephone to arrange for return of the forwarded documentation.

## 2017-11-15 RX ORDER — METOPROLOL SUCCINATE 100 MG/1
100 TABLET, EXTENDED RELEASE ORAL DAILY
Qty: 90 TAB | Refills: 3 | Status: SHIPPED | OUTPATIENT
Start: 2017-11-15

## 2017-11-21 ENCOUNTER — HOSPITAL ENCOUNTER (OUTPATIENT)
Dept: LAB | Age: 77
Discharge: HOME | End: 2017-11-21
Payer: MEDICARE

## 2017-11-21 ENCOUNTER — OFFICE VISIT (OUTPATIENT)
Dept: CARDIOLOGY CLINIC | Age: 77
End: 2017-11-21

## 2017-11-21 ENCOUNTER — HOSPITAL ENCOUNTER (OUTPATIENT)
Dept: GENERAL RADIOLOGY | Age: 77
Discharge: HOME | End: 2017-11-21
Payer: MEDICARE

## 2017-11-21 VITALS — WEIGHT: 124 LBS | BODY MASS INDEX: 21.28 KG/M2

## 2017-11-21 DIAGNOSIS — Z95.2 S/P MVR (MITRAL VALVE REPLACEMENT): ICD-10-CM

## 2017-11-21 DIAGNOSIS — Z01.812 PRE-PROCEDURE LAB EXAM: ICD-10-CM

## 2017-11-21 DIAGNOSIS — I50.41 ACUTE COMBINED SYSTOLIC AND DIASTOLIC HEART FAILURE (HCC): ICD-10-CM

## 2017-11-21 DIAGNOSIS — Z01.812 PRE-PROCEDURE LAB EXAM: Primary | ICD-10-CM

## 2017-11-21 LAB
ALBUMIN SERPL-MCNC: 3.7 G/DL (ref 3.4–5)
ALBUMIN/GLOB SERPL: 1.2 {RATIO} (ref 0.8–1.7)
ALP SERPL-CCNC: 86 U/L (ref 45–117)
ALT SERPL-CCNC: 29 U/L (ref 13–56)
ANION GAP SERPL CALC-SCNC: 11 MMOL/L (ref 3–18)
AST SERPL-CCNC: 26 U/L (ref 15–37)
BASOPHILS # BLD: 0.1 K/UL (ref 0–0.1)
BASOPHILS NFR BLD: 1 % (ref 0–2)
BILIRUB SERPL-MCNC: 0.8 MG/DL (ref 0.2–1)
BUN SERPL-MCNC: 24 MG/DL (ref 7–18)
BUN/CREAT SERPL: 21 (ref 12–20)
CALCIUM SERPL-MCNC: 8.9 MG/DL (ref 8.5–10.1)
CHLORIDE SERPL-SCNC: 98 MMOL/L (ref 100–108)
CO2 SERPL-SCNC: 27 MMOL/L (ref 21–32)
CREAT SERPL-MCNC: 1.15 MG/DL (ref 0.6–1.3)
DIFFERENTIAL METHOD BLD: ABNORMAL
EOSINOPHIL # BLD: 0.1 K/UL (ref 0–0.4)
EOSINOPHIL NFR BLD: 1 % (ref 0–5)
ERYTHROCYTE [DISTWIDTH] IN BLOOD BY AUTOMATED COUNT: 19.7 % (ref 11.6–14.5)
GLOBULIN SER CALC-MCNC: 3.2 G/DL (ref 2–4)
GLUCOSE SERPL-MCNC: 115 MG/DL (ref 74–99)
HCT VFR BLD AUTO: 28.8 % (ref 35–45)
HGB BLD-MCNC: 8 G/DL (ref 12–16)
INR PPP: 2.4 (ref 0.8–1.2)
LYMPHOCYTES # BLD: 1.1 K/UL (ref 0.9–3.6)
LYMPHOCYTES NFR BLD: 13 % (ref 21–52)
MCH RBC QN AUTO: 18.9 PG (ref 24–34)
MCHC RBC AUTO-ENTMCNC: 27.8 G/DL (ref 31–37)
MCV RBC AUTO: 68.1 FL (ref 74–97)
MONOCYTES # BLD: 1.1 K/UL (ref 0.05–1.2)
MONOCYTES NFR BLD: 13 % (ref 3–10)
NEUTS SEG # BLD: 6 K/UL (ref 1.8–8)
NEUTS SEG NFR BLD: 72 % (ref 40–73)
PLATELET # BLD AUTO: 384 K/UL (ref 135–420)
PLATELET COMMENTS,PCOM: ABNORMAL
PMV BLD AUTO: 9.4 FL (ref 9.2–11.8)
POTASSIUM SERPL-SCNC: 3.3 MMOL/L (ref 3.5–5.5)
PROT SERPL-MCNC: 6.9 G/DL (ref 6.4–8.2)
PROTHROMBIN TIME: 25 SEC (ref 11.5–15.2)
RBC # BLD AUTO: 4.23 M/UL (ref 4.2–5.3)
RBC MORPH BLD: ABNORMAL
SODIUM SERPL-SCNC: 136 MMOL/L (ref 136–145)
WBC # BLD AUTO: 8.4 K/UL (ref 4.6–13.2)

## 2017-11-21 PROCEDURE — 85610 PROTHROMBIN TIME: CPT | Performed by: INTERNAL MEDICINE

## 2017-11-21 PROCEDURE — 36415 COLL VENOUS BLD VENIPUNCTURE: CPT | Performed by: INTERNAL MEDICINE

## 2017-11-21 PROCEDURE — 85025 COMPLETE CBC W/AUTO DIFF WBC: CPT | Performed by: INTERNAL MEDICINE

## 2017-11-21 PROCEDURE — 80053 COMPREHEN METABOLIC PANEL: CPT | Performed by: INTERNAL MEDICINE

## 2017-11-21 PROCEDURE — 71020 XR CHEST PA LAT: CPT

## 2017-11-21 RX ORDER — ENOXAPARIN SODIUM 100 MG/ML
60 INJECTION SUBCUTANEOUS EVERY 12 HOURS
Qty: 10 SYRINGE | Refills: 0 | Status: SHIPPED | OUTPATIENT
Start: 2017-11-21 | End: 2017-12-08 | Stop reason: ALTCHOICE

## 2017-11-21 NOTE — MR AVS SNAPSHOT
Visit Information Date & Time Provider Department Dept. Phone Encounter #  
 11/21/2017 11:30 AM [facility] [phone] [id number] Upcoming Health Maintenance Date Due DTaP/Tdap/Td series (1 - Tdap) 1/2/1963 ZOSTER VACCINE AGE 60> 11/2/2001 GLAUCOMA SCREENING Q2Y 1/2/2007 OSTEOPOROSIS SCREENING (DEXA) 1/2/2007 Pneumococcal 65+ Low/Medium Risk (1 of 2 - PCV13) 1/2/2007 MEDICARE YEARLY EXAM 1/2/2007 Influenza Age 5 to Adult 8/1/2017 Allergies as of 11/21/2017  Review Complete On: 11/9/2017 By: Janella Pallas, MD  
 No Known Allergies Current Immunizations  Never Reviewed No immunizations on file. Not reviewed this visit You Were Diagnosed With   
  
 Codes Comments Pre-procedure lab exam    -  Primary ICD-10-CM: B03.468 ICD-9-CM: V72.63 Acute combined systolic and diastolic heart failure (HCC)     ICD-10-CM: I50.41 ICD-9-CM: 428.41 S/P MVR (mitral valve replacement)     ICD-10-CM: N91.3 ICD-9-CM: V43.3 Vitals Weight(growth percentile) BMI OB Status Smoking Status 124 lb (56.2 kg) 21.28 kg/m2 Postmenopausal Former Smoker BMI and BSA Data Body Mass Index Body Surface Area  
 21.28 kg/m 2 1.59 m 2 Preferred Pharmacy Pharmacy Name Phone [facility], [address] [phone] Your Updated Medication List  
  
   
This list is accurate as of: 11/21/17 11:57 AM.  Always use your most recent med list.  
  
  
  
  
 albuterol 90 mcg/actuation inhaler Commonly known as:  PROVENTIL HFA Take 1 puff by inhalation every four (4) hours as needed. amLODIPine 5 mg tablet Commonly known as:  Courtney Grossmaner Take 1 Tab by mouth daily. aspirin delayed-release 81 mg tablet Take 1 Tab by mouth daily. bumetanide 2 mg tablet Commonly known as:  Assunta Brink Take 1 Tab by mouth two (2) times a day. lisinopril 5 mg tablet Commonly known as:  Sonia November Take 1 Tab by mouth daily. metoprolol succinate 100 mg tablet Commonly known as:  TOPROL XL Take 1 Tab by mouth daily. potassium chloride SR 20 mEq tablet Commonly known as:  K-TAB Take 2 Tabs by mouth two (2) times a day. vitamin E 400 unit capsule Commonly known as:  [address] Take 400 Units by mouth daily. warfarin 6 mg tablet Commonly known as:  COUMADIN Take 1 Tab by mouth daily. or as directed To-Do List   
 11/21/2017 Lab:  CBC WITH AUTOMATED DIFF   
  
 11/21/2017 Lab:  METABOLIC PANEL, COMPREHENSIVE   
  
 11/21/2017 Lab:  PROTHROMBIN TIME + INR   
  
 11/21/2017 Imaging:  XR CHEST PA LAT   
  
 12/01/2017 8:00 AM  
  Appointment with [facility] CAD NO ANESTHESIA; [facility] 1 CATH LAB; [facility] CATH HOLDING at [address] [place] [address] ([phone]) Patient Instructions Instructions Patients Name:  Boby Matthews 1. You are scheduled to have a Right and Left heart catherization on 12/1/17  at 8 am Please check in at 7am  . 2. Please go to [facility] and park in the outpatient parking lot that is located around to the back of the hospital and enter through the [facility] building. Once you enter through the [facility] check in with the  there. The  will either give you directions or assist you in getting to the cath holding area. 3. You are not to eat or drink anything after midnight the night before your procedure. Small sips of water to take your medications is ok. 4. If you are diabetic, do not take your insulin/sugar pill the morning of the procedure. 5. MEDICATION INSTRUCTIONS:   Please take your morning medications with the following special instructions: 
 
          Please make sure to take your Blood pressure medication : metoprolol, lisinopril, and amlodipine.  Do not take Bumex the morning of the procedure. Do not take Coumadin for 5 days prior to procedure. You will bridge with Lovenox. Follow Coumadin dosing calender Take your Aspirin Stop your Coumadin on 11/26/17  and do not resume it until after the procedure. You will use Lovenox as instructed 6. We encourage families to wait in the waiting room on the first floor while the procedure is being done. The Doctor will come out and talk with you as soon as the procedure is over. 7. There is the possibility that you may spend the night in the hospital, depending on the results of the procedure. This will be determined after the procedure is done. If angioplasty or stent is planned, you will stay at least one day. 8. If you or your family have any questions, please call our office Monday Friday, 9:00 a. m.4:30 p.m.,  At [phone], and ask to speak to one of the nurses. Introducing [facility]! Dear Carmine Gambino: 
Thank you for requesting a [employer] account. Our records indicate that you already have an active [employer] account. You can access your account anytime at [email or web address]. [email or web address] Did you know that you can access your hospital and ER discharge instructions at any time in [employer]? You can also review all of your test results from your hospital stay or ER visit. Additional Information If you have questions, please visit the Frequently Asked Questions section of the [employer] website at [email or web address] [employer]/. Remember, [employer] is NOT to be used for urgent needs. For medical emergencies, dial 911. Now available from your iPhone and Android! Please provide this summary of care documentation to your next provider. Your primary care clinician is listed as NONE. If you have any questions after today's visit, please call [phone].

## 2017-11-21 NOTE — PATIENT INSTRUCTIONS
Instructions    Patients Name:  Deepa Crespo    1. You are scheduled to have a Right and Left heart catheterization on 12/1/17  at 8 am Please check in at 7am  .     2. Please go to [facility] and park in the outpatient parking lot that is located around to the back of the hospital and enter through the [facility] building. Once you enter through the [facility] check in with the  there. The  will either give you directions or assist you in getting to the cath holding area. 3. You are not to eat or drink anything after midnight the night before your procedure. Small sips of water to take your medications is ok. 4. If you are diabetic, do not take your insulin/sugar pill the morning of the procedure. 5. MEDICATION INSTRUCTIONS:   Please take your morning medications with the following special instructions:    [x]          Please make sure to take your Blood pressure medication : metoprolol, lisinopril, and amlodipine. Do not take Bumex the morning of the procedure. Do not take Coumadin for 5 days prior to procedure. You will bridge with Lovenox. Follow Coumadin dosing calender    [x]          Take your Aspirin     [x]          Stop your Coumadin on 11/26/17  and do not resume it until after the procedure. You will use Lovenox as instructed        6. We encourage families to wait in the waiting room on the first floor while the procedure is being done. The Doctor will come out and talk with you as soon as the procedure is over. 7. There is the possibility that you may spend the night in the hospital, depending on the results of the procedure. This will be determined after the procedure is done. If angioplasty or stent is planned, you will stay at least one day. 8. If you or your family have any questions, please call our office Monday Friday, 9:00 a. m.4:30 p.m.,  At [phone], and ask to speak to one of the nurses.

## 2017-11-21 NOTE — PROGRESS NOTES
Patient given written instructions at time of visit and allowed time for questions to be answered Mark Simms LPN

## 2017-11-27 NOTE — PROGRESS NOTES
Dr Philip Torres, this is pre cath labs (scheduled on 1 Dec 2017).    Her potassium is 3.3mmol/L vs 2.9mmol/L  a year ago (when she was started on supplemental potassium)

## 2017-11-28 ENCOUNTER — TELEPHONE (OUTPATIENT)
Dept: CARDIOLOGY CLINIC | Age: 77
End: 2017-11-28

## 2017-11-28 NOTE — TELEPHONE ENCOUNTER
----- Message from Dayron Toth MD sent at 11/28/2017  9:13 AM EST -----  Regarding: abnormal labs  Patient also has a severe microcytic anemia which is new for her. She may be having underlying GI bleed. She does not have a primary care physician, so we need to contact her and refer her to a GI physician. She lives in [place], so we may need to find someone who is closer to her home. Please call her one day this week while I am in the office so this can all be arranged  ----- Message -----     From: Cecelia Dire     Sent: 11/27/2017   8:57 AM       To: MD Dr Susu Fine, this is pre cath labs (scheduled on 1 Dec 2017). Her potassium is 3.3mmol/L vs 2.9mmol/L  a year ago (when she was started on supplemental potassium)   Addressed it with Dr Pepper Ruby, since you were out of the office, he increased potassium to 80 MeQ daily until cath.

## 2017-12-01 ENCOUNTER — HOSPITAL ENCOUNTER (INPATIENT)
Dept: CARDIAC CATH/INVASIVE PROCEDURES | Age: 77
LOS: 2 days | Discharge: HOME | DRG: 811 | End: 2017-12-03
Attending: INTERNAL MEDICINE | Admitting: HOSPITALIST
Payer: MEDICARE

## 2017-12-01 PROBLEM — D64.9 ANEMIA: Status: ACTIVE | Noted: 2017-12-01

## 2017-12-01 PROBLEM — D64.9 SYMPTOMATIC ANEMIA: Status: ACTIVE | Noted: 2017-12-01

## 2017-12-01 LAB
ANION GAP SERPL CALC-SCNC: 5 MMOL/L (ref 3–18)
BUN SERPL-MCNC: 24 MG/DL (ref 7–18)
BUN/CREAT SERPL: 24 (ref 12–20)
CALCIUM SERPL-MCNC: 8.5 MG/DL (ref 8.5–10.1)
CHLORIDE SERPL-SCNC: 104 MMOL/L (ref 100–108)
CO2 SERPL-SCNC: 28 MMOL/L (ref 21–32)
CREAT SERPL-MCNC: 1.01 MG/DL (ref 0.6–1.3)
ERYTHROCYTE [DISTWIDTH] IN BLOOD BY AUTOMATED COUNT: 19.9 % (ref 11.6–14.5)
FERRITIN SERPL-MCNC: 8 NG/ML (ref 8–388)
FOLATE SERPL-MCNC: >20 NG/ML (ref 3.1–17.5)
GLUCOSE SERPL-MCNC: 112 MG/DL (ref 74–99)
HCT VFR BLD AUTO: 27 % (ref 35–45)
HCT VFR BLD AUTO: 27.6 % (ref 35–45)
HGB BLD-MCNC: 7.6 G/DL (ref 12–16)
HGB BLD-MCNC: 7.6 G/DL (ref 12–16)
INR PPP: 1.4 (ref 0.8–1.2)
IRON SATN MFR SERPL: 3 %
IRON SERPL-MCNC: 17 UG/DL (ref 50–175)
MAGNESIUM SERPL-MCNC: 2.3 MG/DL (ref 1.6–2.6)
MCH RBC QN AUTO: 19.2 PG (ref 24–34)
MCHC RBC AUTO-ENTMCNC: 28.1 G/DL (ref 31–37)
MCV RBC AUTO: 68.2 FL (ref 74–97)
PHOSPHATE SERPL-MCNC: 3.3 MG/DL (ref 2.5–4.9)
PLATELET # BLD AUTO: 285 K/UL (ref 135–420)
PMV BLD AUTO: 8.9 FL (ref 9.2–11.8)
POTASSIUM SERPL-SCNC: 4.4 MMOL/L (ref 3.5–5.5)
PROTHROMBIN TIME: 16.7 SEC (ref 11.5–15.2)
RBC # BLD AUTO: 3.96 M/UL (ref 4.2–5.3)
SODIUM SERPL-SCNC: 137 MMOL/L (ref 136–145)
TIBC SERPL-MCNC: 511 UG/DL (ref 250–450)
VIT B12 SERPL-MCNC: 1753 PG/ML (ref 211–911)
WBC # BLD AUTO: 7.4 K/UL (ref 4.6–13.2)

## 2017-12-01 PROCEDURE — 4A023N7 MEASUREMENT OF CARDIAC SAMPLING AND PRESSURE, LEFT HEART, PERCUTANEOUS APPROACH: ICD-10-PCS | Performed by: INTERNAL MEDICINE

## 2017-12-01 PROCEDURE — 83735 ASSAY OF MAGNESIUM: CPT | Performed by: HOSPITALIST

## 2017-12-01 PROCEDURE — 30233N1 TRANSFUSION OF NONAUTOLOGOUS RED BLOOD CELLS INTO PERIPHERAL VEIN, PERCUTANEOUS APPROACH: ICD-10-PCS | Performed by: HOSPITALIST

## 2017-12-01 PROCEDURE — 74011250636 HC RX REV CODE- 250/636: Performed by: INTERNAL MEDICINE

## 2017-12-01 PROCEDURE — 85610 PROTHROMBIN TIME: CPT | Performed by: INTERNAL MEDICINE

## 2017-12-01 PROCEDURE — B2111ZZ FLUOROSCOPY OF MULTIPLE CORONARY ARTERIES USING LOW OSMOLAR CONTRAST: ICD-10-PCS | Performed by: INTERNAL MEDICINE

## 2017-12-01 PROCEDURE — 84100 ASSAY OF PHOSPHORUS: CPT | Performed by: HOSPITALIST

## 2017-12-01 PROCEDURE — 85027 COMPLETE CBC AUTOMATED: CPT | Performed by: INTERNAL MEDICINE

## 2017-12-01 PROCEDURE — 82728 ASSAY OF FERRITIN: CPT | Performed by: HOSPITALIST

## 2017-12-01 PROCEDURE — 74011250637 HC RX REV CODE- 250/637: Performed by: INTERNAL MEDICINE

## 2017-12-01 PROCEDURE — 4A1239Z MONITORING OF CARDIAC OUTPUT, PERCUTANEOUS APPROACH: ICD-10-PCS | Performed by: INTERNAL MEDICINE

## 2017-12-01 PROCEDURE — 80048 BASIC METABOLIC PNL TOTAL CA: CPT | Performed by: HOSPITALIST

## 2017-12-01 PROCEDURE — 83540 ASSAY OF IRON: CPT | Performed by: HOSPITALIST

## 2017-12-01 PROCEDURE — 85018 HEMOGLOBIN: CPT | Performed by: HOSPITALIST

## 2017-12-01 PROCEDURE — 65270000029 HC RM PRIVATE

## 2017-12-01 PROCEDURE — 77030013797 CARDIAC CATHETERIZATION

## 2017-12-01 PROCEDURE — 82607 VITAMIN B-12: CPT | Performed by: HOSPITALIST

## 2017-12-01 PROCEDURE — 74011000250 HC RX REV CODE- 250: Performed by: INTERNAL MEDICINE

## 2017-12-01 PROCEDURE — B2161ZZ FLUOROSCOPY OF RIGHT AND LEFT HEART USING LOW OSMOLAR CONTRAST: ICD-10-PCS | Performed by: INTERNAL MEDICINE

## 2017-12-01 PROCEDURE — 74011636320 HC RX REV CODE- 636/320: Performed by: INTERNAL MEDICINE

## 2017-12-01 RX ORDER — ALBUTEROL SULFATE 0.83 MG/ML
2.5 SOLUTION RESPIRATORY (INHALATION)
Status: DISCONTINUED | OUTPATIENT
Start: 2017-12-01 | End: 2017-12-01 | Stop reason: SDUPTHER

## 2017-12-01 RX ORDER — HEPARIN SODIUM 200 [USP'U]/100ML
500 INJECTION, SOLUTION INTRAVENOUS ONCE
Status: COMPLETED | OUTPATIENT
Start: 2017-12-01 | End: 2017-12-01

## 2017-12-01 RX ORDER — LIDOCAINE HYDROCHLORIDE 10 MG/ML
1-30 INJECTION, SOLUTION EPIDURAL; INFILTRATION; INTRACAUDAL; PERINEURAL
Status: DISCONTINUED | OUTPATIENT
Start: 2017-12-01 | End: 2017-12-01 | Stop reason: HOSPADM

## 2017-12-01 RX ORDER — ACETAMINOPHEN 500 MG
500 TABLET ORAL
Status: DISCONTINUED | OUTPATIENT
Start: 2017-12-01 | End: 2017-12-03 | Stop reason: HOSPADM

## 2017-12-01 RX ORDER — MORPHINE SULFATE 2 MG/ML
0.5 INJECTION, SOLUTION INTRAMUSCULAR; INTRAVENOUS
Status: DISCONTINUED | OUTPATIENT
Start: 2017-12-01 | End: 2017-12-01

## 2017-12-01 RX ORDER — FUROSEMIDE 10 MG/ML
40 INJECTION INTRAMUSCULAR; INTRAVENOUS 2 TIMES DAILY
Status: DISCONTINUED | OUTPATIENT
Start: 2017-12-01 | End: 2017-12-02

## 2017-12-01 RX ORDER — LISINOPRIL 5 MG/1
5 TABLET ORAL DAILY
Status: DISCONTINUED | OUTPATIENT
Start: 2017-12-01 | End: 2017-12-03 | Stop reason: HOSPADM

## 2017-12-01 RX ORDER — ASCORBIC ACID 250 MG
250 TABLET ORAL
Status: DISCONTINUED | OUTPATIENT
Start: 2017-12-02 | End: 2017-12-03 | Stop reason: HOSPADM

## 2017-12-01 RX ORDER — METOPROLOL SUCCINATE 100 MG/1
100 TABLET, EXTENDED RELEASE ORAL DAILY
Status: DISCONTINUED | OUTPATIENT
Start: 2017-12-01 | End: 2017-12-02

## 2017-12-01 RX ORDER — FENTANYL CITRATE 50 UG/ML
12.5-1 INJECTION, SOLUTION INTRAMUSCULAR; INTRAVENOUS
Status: DISCONTINUED | OUTPATIENT
Start: 2017-12-01 | End: 2017-12-01 | Stop reason: HOSPADM

## 2017-12-01 RX ORDER — ENOXAPARIN SODIUM 100 MG/ML
60 INJECTION SUBCUTANEOUS EVERY 12 HOURS
Status: DISCONTINUED | OUTPATIENT
Start: 2017-12-01 | End: 2017-12-03 | Stop reason: HOSPADM

## 2017-12-01 RX ORDER — ACETAMINOPHEN 325 MG/1
650 TABLET ORAL
Status: DISCONTINUED | OUTPATIENT
Start: 2017-12-01 | End: 2017-12-01

## 2017-12-01 RX ORDER — MIDAZOLAM HYDROCHLORIDE 1 MG/ML
.5-2 INJECTION, SOLUTION INTRAMUSCULAR; INTRAVENOUS
Status: DISCONTINUED | OUTPATIENT
Start: 2017-12-01 | End: 2017-12-01 | Stop reason: HOSPADM

## 2017-12-01 RX ORDER — IPRATROPIUM BROMIDE AND ALBUTEROL SULFATE 2.5; .5 MG/3ML; MG/3ML
3 SOLUTION RESPIRATORY (INHALATION)
Status: DISCONTINUED | OUTPATIENT
Start: 2017-12-01 | End: 2017-12-03 | Stop reason: HOSPADM

## 2017-12-01 RX ORDER — ASPIRIN 81 MG/1
81 TABLET ORAL DAILY
Status: DISCONTINUED | OUTPATIENT
Start: 2017-12-02 | End: 2017-12-03 | Stop reason: HOSPADM

## 2017-12-01 RX ORDER — AMLODIPINE BESYLATE 5 MG/1
5 TABLET ORAL DAILY
Status: DISCONTINUED | OUTPATIENT
Start: 2017-12-01 | End: 2017-12-03 | Stop reason: HOSPADM

## 2017-12-01 RX ORDER — MORPHINE SULFATE 4 MG/ML
0.5 INJECTION, SOLUTION INTRAMUSCULAR; INTRAVENOUS
Status: DISCONTINUED | OUTPATIENT
Start: 2017-12-01 | End: 2017-12-03 | Stop reason: HOSPADM

## 2017-12-01 RX ORDER — ONDANSETRON 2 MG/ML
4 INJECTION INTRAMUSCULAR; INTRAVENOUS
Status: DISCONTINUED | OUTPATIENT
Start: 2017-12-01 | End: 2017-12-03 | Stop reason: HOSPADM

## 2017-12-01 RX ORDER — OXYCODONE AND ACETAMINOPHEN 5; 325 MG/1; MG/1
1 TABLET ORAL
Status: DISCONTINUED | OUTPATIENT
Start: 2017-12-01 | End: 2017-12-03 | Stop reason: HOSPADM

## 2017-12-01 RX ORDER — SODIUM CHLORIDE 9 MG/ML
50 INJECTION, SOLUTION INTRAVENOUS CONTINUOUS
Status: DISPENSED | OUTPATIENT
Start: 2017-12-01 | End: 2017-12-01

## 2017-12-01 RX ORDER — LANOLIN ALCOHOL/MO/W.PET/CERES
1 CREAM (GRAM) TOPICAL
Status: DISCONTINUED | OUTPATIENT
Start: 2017-12-02 | End: 2017-12-03 | Stop reason: HOSPADM

## 2017-12-01 RX ORDER — ALBUTEROL SULFATE 90 UG/1
1 AEROSOL, METERED RESPIRATORY (INHALATION)
Status: DISCONTINUED | OUTPATIENT
Start: 2017-12-01 | End: 2017-12-01 | Stop reason: CLARIF

## 2017-12-01 RX ORDER — HYDRALAZINE HYDROCHLORIDE 20 MG/ML
10 INJECTION INTRAMUSCULAR; INTRAVENOUS
Status: DISCONTINUED | OUTPATIENT
Start: 2017-12-01 | End: 2017-12-03 | Stop reason: HOSPADM

## 2017-12-01 RX ORDER — DOCUSATE SODIUM 100 MG/1
100 CAPSULE, LIQUID FILLED ORAL
Status: DISCONTINUED | OUTPATIENT
Start: 2017-12-01 | End: 2017-12-03 | Stop reason: HOSPADM

## 2017-12-01 RX ADMIN — LIDOCAINE HYDROCHLORIDE 18 ML: 10 INJECTION, SOLUTION EPIDURAL; INFILTRATION; INTRACAUDAL; PERINEURAL at 08:40

## 2017-12-01 RX ADMIN — ENOXAPARIN SODIUM 60 MG: 60 INJECTION SUBCUTANEOUS at 17:28

## 2017-12-01 RX ADMIN — HEPARIN SODIUM IN SODIUM CHLORIDE 1000 UNITS: 200 INJECTION INTRAVENOUS at 08:25

## 2017-12-01 RX ADMIN — FENTANYL CITRATE 25 MCG: 50 INJECTION INTRAMUSCULAR; INTRAVENOUS at 08:39

## 2017-12-01 RX ADMIN — FUROSEMIDE 40 MG: 10 INJECTION, SOLUTION INTRAMUSCULAR; INTRAVENOUS at 17:28

## 2017-12-01 RX ADMIN — IOPAMIDOL 70 ML: 612 INJECTION, SOLUTION INTRAVENOUS at 09:12

## 2017-12-01 RX ADMIN — MIDAZOLAM HYDROCHLORIDE 1 MG: 1 INJECTION, SOLUTION INTRAMUSCULAR; INTRAVENOUS at 08:39

## 2017-12-01 RX ADMIN — LISINOPRIL 5 MG: 5 TABLET ORAL at 17:28

## 2017-12-01 NOTE — H&P
HISTORY OF PRESENT ILLNESS  Alok Riggins is a 78 y.o. female.     Shortness of Breath   Pertinent negatives include no fever, no headaches, no cough, no wheezing, no PND, no orthopnea, no chest pain, no vomiting, no abdominal pain, no rash, no leg swelling and no claudication. Hypertension   Associated symptoms include shortness of breath. Pertinent negatives include no chest pain, no abdominal pain and no headaches.      Patient presents for a follow-up office visit. Patient has a past medical history significant for chronic rheumatic heart disease, status post 2 prior [employer] Ceferino's mechanical prosthetic mitral valve replacements, most recently in 2003. During her last surgery, and non-coronary cusp of her aortic valve was inadvertently sutured open resulting in moderate to severe aortic insufficiency which has been present for the past 10 years or more. She also has a history of complete heart block following her first valve replacement, resulting in a dual-chamber permanent pacemaker. Through the years the patient has had paroxysmal atrial fibrillation, and remains on warfarin for anticoagulation for both her mechanical valve and the arrhythmia.       Patient recently underwent a follow-up echocardiogram in November 2017 which showed a decrease in her left ventricular ejection fraction down to 30-35%, where it had previously been 50% with akinesis of the inferior apex. She continued to have moderate to severe aortic insufficiency with a normal functioning mechanical mitral valve prosthesis.     She was last seen in the office 5 months ago. Since last visit, she has had progressive worsening shortness of breath with any activity over the past 2-3 months. She denies any weight gain or leg swelling. No significant orthopnea or PND. She has been taking her diuretics regularly. She is now on Bumex 2 mg twice daily. She denies any palpitations.   She does complain of dizzy spells when she stands up too quickly but no dutch syncope or near syncope.             Past Medical History:   Diagnosis Date    AI (aortic insufficiency)       Moderate to severe    Atrial fibrillation (HCC)       on Coumadin    Cardiac echocardiogram 11/08/2016     EF 50%. No WMA. Mild LVH. Gr 2 DDfx. Mild RVE. RVSP 43 mmHg. Severe LAE. Mild ARTI. Prosthetic MV w/normal fx. Mod-severe AI (mean grad 16 mmHg; prev 9 mmHg). Mod TR.  Cardiac Holter monitoring 09/29/2011     Baseline sinus rhythm w/ventricular tracking. Occasional premature ventricular ectopic beats likely correspond w/symptoms.  Complete heart block (HCC)      COPD (chronic obstructive pulmonary disease) (HCC)      HTN (hypertension)      Long term (current) use of anticoagulants 8/19/2011    Mitral valve stenosis and aortic valve insufficiency       status post [employer] 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  Pacemaker       Medtronic dual-chamber              Current Outpatient Prescriptions   Medication Sig Dispense Refill    amLODIPine (NORVASC) 5 mg tablet Take 1 Tab by mouth daily. 90 Tab 3    bumetanide (BUMEX) 2 mg tablet Take 1 Tab by mouth two (2) times a day. 180 Tab 3    warfarin (COUMADIN) 6 mg tablet Take 1 Tab by mouth daily. or as directed 135 Tab 3    potassium chloride SR (K-TAB) 20 mEq tablet Take 2 Tabs by mouth two (2) times a day. (Patient taking differently: Take 20 mEq by mouth two (2) times a day.) 360 Tab 3    metoprolol succinate (TOPROL XL) 100 mg tablet Take 1 Tab by mouth daily. 90 Tab 3    albuterol (PROVENTIL HFA) 90 mcg/actuation inhaler Take 1 puff by inhalation every four (4) hours as needed. 3 Inhaler 3    aspirin delayed-release 81 mg tablet Take 1 Tab by mouth daily.  90 Tab 3    vitamin E (AQUA GEMS) 400 unit capsule Take 400 Units by mouth daily.           No Known Allergies            Social History   Substance Use Topics  Smoking status: Former Smoker       Packs/day: 0.50       Years: 30.00       Quit date: 8/19/1999    Smokeless tobacco: Never Used    Alcohol use No                Family History   Problem Relation Age of Onset    Heart Failure Mother      Heart Failure Father              Review of Systems   Constitutional: Negative for chills, fever and weight loss. HENT: Negative for nosebleeds. Eyes: Negative for blurred vision and double vision. Respiratory: Positive for shortness of breath. Negative for cough and wheezing. Cardiovascular: Negative for chest pain, palpitations, orthopnea, claudication, leg swelling and PND. Gastrointestinal: Negative for abdominal pain, heartburn, nausea and vomiting. Genitourinary: Negative for dysuria and hematuria. Musculoskeletal: Negative for falls and myalgias. Skin: Negative for rash. Neurological: Positive for dizziness. Negative for focal weakness and headaches. Endo/Heme/Allergies: Does not bruise/bleed easily. Psychiatric/Behavioral: Negative for substance abuse.           Visit Vitals    /60    Pulse 78    Ht 5' 4\" (1.626 m)    Wt 56.2 kg (124 lb)    SpO2 97%    BMI 21.28 kg/m2      Physical Exam   Constitutional: She is oriented to person, place, and time. She appears well-developed and well-nourished. HENT:   Head: Normocephalic and atraumatic. Eyes: Conjunctivae are normal.   Neck: Neck supple. No JVD present. Carotid bruit is not present. Cardiovascular: Normal rate, regular rhythm, S1 normal, S2 normal and normal pulses. Exam reveals no gallop. Murmur heard. Midsystolic murmur is present with a grade of 2/6  at the upper right sternal border  High-pitched blowing decrescendo early diastolic murmur is present with a grade of 2/6  at the upper right sternal border radiating to the apex  Pulmonary/Chest: Effort normal. She has decreased breath sounds. She has no wheezes. She has no rales. Abdominal: Soft.  Bowel sounds are normal. There is no tenderness. Musculoskeletal: She exhibits no edema, tenderness or deformity. Neurological: She is alert and oriented to person, place, and time. Skin: Skin is warm and dry. Psychiatric: She has a normal mood and affect. Her behavior is normal. Thought content normal.      EKG:  Atrial fibrillation, 100% ventricular pacing. Compared to the previous EKG, no significant interval change.     Pacemaker interrogation. Battery at beginning of life. Estimated longevity at 5 years. Patient in the ventricle 73 %, persistent atrial fibrillation since last device check. No high rate episodes. Scanned document for details.     ASSESSMENT and PLAN     Acute systolic and diastolic heart failure. Patient does have a history of chronic diastolic heart failure, however her ejection fraction is now reduced compared to baseline. Her EF was 30-35% on a recent echocardiogram earlier this month. She does have worsening symptoms of shortness of breath now with any activity. However, her volume status appears to be relatively stable. I recommended further evaluation with a right and left heart catheterization to assess her right-sided  hemodynamics, left ventricular filling pressure, and assess for any significant coronary artery disease. She will need to be bridged with  Lovenox when she holds her warfarin. I would also like to start her on a low-dose of an ACE inhibitor now that her ejection fraction has decreased. I will continue her current beta-blocker dosage and her scheduled diuretics.     Rheumatic valvular heart disease. Status post mechanical [employer]. Ceferino's, prosthetic mitral valve replacement x 2, the last surgery in 2003. She also has residual aortic valve disease with mild stenosis and moderate to severe aortic insufficiency. A repeat echocardiogram in November 2017 demonstrated a normal functioning mitral valve mechanical prosthesis and moderate to severe aortic insufficiency.   EF down to 30-35 %. Her goal INR is 3.0. She will require bridging for her catheterization.     Paroxysmal atrial fibrillation. The patient has remained in atrial fibrillation for the past 12 months she has never had any high rate episodes. She is relatively asymptomatic to the arrhythmias, although this may be contributing to her heart failure symptoms. It is highly unlikely that she will remain in sinus rhythm if a cardioversion is attempted. She will need to remain on anticoagulation indefinitely.     Complete heart block. This occurs on her initial valve surgery. She is now pacemaker dependent in the ventricle. Her current pacemaker generator is at beginning of life. Normal device function on interrogation today.     Hypertension. Patient blood pressure is well controlled on her regimen which includes metoprolol and amlodipine.      Dyslipidemia. A lipid panel from March 2015 showed an elevated total cholesterol at 230, , HDL 50. She would likely benefit from starting on a statin. The patient has not wanted to start a statin in the past.    Microcytic anemia. New finding of pre-procedure labs. Hgb 7.6-8.0, discussed at length with patient, have recommended admission following cardiac cath to work up a possible GI blood loss.

## 2017-12-01 NOTE — IP AVS SNAPSHOT
[address] Patient: Daniela Clark MRN: [id number] About your hospitalization You were admitted on:  December 1, 2017 You last received care in the:  [facility] [id number] [place] [address] You were discharged on:  December 3, 2017 Why you were hospitalized Your primary diagnosis was:  Not on File Your diagnoses also included:  Anemia, Symptomatic Anemia Things You Need To Do (next 8 weeks) Follow up with None Where:  None (395) Patient stated that they have no PCP Discharge Orders None A check prasad indicates which time of day the medication should be taken. My Medications TAKE these medications as instructed Instructions Each Dose to Equal  
 Morning Noon Evening Bedtime  
 amLODIPine 5 mg tablet Commonly known as:  Saroj Stafford Your last dose was: Your next dose is: Take 1 Tab by mouth daily. 5 mg  
    
   
   
   
  
 ascorbic acid (vitamin C) 250 mg tablet Commonly known as:  VITAMIN C Start taking on:  12/4/2017 Your last dose was: Your next dose is: Take 1 Tab by mouth daily (with breakfast). 250 mg  
    
   
   
   
  
 aspirin delayed-release 81 mg tablet Your last dose was: Your next dose is: Take 1 Tab by mouth daily. 81 mg  
    
   
   
   
  
 bumetanide 2 mg tablet Commonly known as:  Cristine Genet Your last dose was: Your next dose is: Take 1 Tab by mouth two (2) times a day. 2 mg  
    
   
   
   
  
 docusate sodium 100 mg capsule Commonly known as:  Juwan Lass Your last dose was: Your next dose is: Take 1 Cap by mouth daily (with breakfast) for 90 days. HOLD for loose stool. 100 mg  
    
   
   
   
  
 enoxaparin 60 mg/0.6 mL injection Commonly known as:  LOVENOX Your last dose was: Your next dose is:    
   
   
 60 mg by SubCUTAneous route every twelve (12) hours. 60 mg  
    
   
   
   
  
 ferrous sulfate 325 mg (65 mg iron) tablet Start taking on:  12/4/2017 Your last dose was: Your next dose is: Take 1 Tab by mouth daily (with breakfast). 325 mg  
    
   
   
   
  
 lisinopril 5 mg tablet Commonly known as:  Brad Economy Start taking on:  12/4/2017 Your last dose was: Your next dose is: Take 1 Tab by mouth daily. 5 mg  
    
   
   
   
  
 metoprolol succinate 100 mg tablet Commonly known as:  TOPROL XL Your last dose was: Your next dose is: Take 1 Tab by mouth daily. 100 mg  
    
   
   
   
  
 potassium chloride SR 20 mEq tablet Commonly known as:  K-TAB Your last dose was: Your next dose is: Take 2 Tabs by mouth two (2) times a day. 40 mEq  
    
   
   
   
  
 vitamin E 400 unit capsule Commonly known as:  [address] Your last dose was: Your next dose is: Take 400 Units by mouth daily. 400 Units  
    
   
   
   
  
 warfarin 6 mg tablet Commonly known as:  COUMADIN Your last dose was: Your next dose is: Take 1 Tab by mouth daily. or as directed 6 mg Where to Get Your Medications Information on where to get these meds will be given to you by the nurse or doctor. ! Ask your nurse or doctor about these medications  
  ascorbic acid (vitamin C) 250 mg tablet  
 docusate sodium 100 mg capsule  
 ferrous sulfate 325 mg (65 mg iron) tablet  
 lisinopril 5 mg tablet Discharge Instructions DISCHARGE SUMMARY from Nurse PATIENT INSTRUCTIONS: 
 
 
F-face looks uneven A-arms unable to move or move unevenly S-speech slurred or non-existent T-time-call 911 as soon as signs and symptoms begin-DO NOT go Back to bed or wait to see if you get better-TIME IS BRAIN. Warning Signs of HEART ATTACK Call 911 if you have these symptoms: 
? Chest discomfort. Most heart attacks involve discomfort in the center of the chest that lasts more than a few minutes, or that goes away and comes back. It can feel like uncomfortable pressure, squeezing, fullness, or pain. ? Discomfort in other areas of the upper body. Symptoms can include pain or discomfort in one or both arms, the back, neck, jaw, or stomach. ? Shortness of breath with or without chest discomfort. ? Other signs may include breaking out in a cold sweat, nausea, or lightheadedness. Don't wait more than five minutes to call [address]! Fast action can save your life. Calling 911 is almost always the fastest way to get lifesaving treatment. Emergency Medical Services staff can begin treatment when they arrive  up to an hour sooner than if someone gets to the hospital by car. Patient armband removed and shredded MyChart Activation Thank you for requesting access to [facility]. Please follow the instructions below to securely access and download your online medical record. [facility] allows you to send messages to your doctor, view your test results, renew your prescriptions, schedule appointments, and more. How Do I Sign Up? 1. In your internet browser, go to [email or web address] 
2. Click on the First Time User? Click Here link in the Sign In box. You will be redirect to the New Member Sign Up page. 3. Enter your [employer] Access Code exactly as it appears below. You will not need to use this code after youve completed the sign-up process. If you do not sign up before the expiration date, you must request a new code. MyChart Access Code: Activation code not generated Current [facility] Status: Active (This is the date your [employer] access code will ) 4. Enter the last four digits of your Social Security Number (xxxx) and Date of Birth (mm/dd/yyyy) as indicated and click Submit. You will be taken to the next sign-up page. 5. Create a [employer] ID. This will be your [facility] [employer] login ID and cannot be changed, so think of one that is secure and easy to remember. 6. Create a [employer] password. You can change your password at any time. 7. Enter your Password Reset Question and Answer. This can be used at a later time if you forget your password. 8. Enter your e-mail address. You will receive e-mail notification when new information is available in [address]. 9. Click Sign Up. You can now view and download portions of your medical record. 10. Click the Download Summary menu link to download a portable copy of your medical information. Additional Information If you have questions, please visit the Frequently Asked Questions section of the [employer] website at [email or web address] Remember, [employer] is NOT to be used for urgent needs. For medical emergencies, dial 911. The discharge information has been reviewed with the patient. The patient verbalized understanding. Discharge medications reviewed with the patient and appropriate educational materials and side effects teaching were provided. ___________________________________________________________________________________________________________________________________ ACO Transitions of Care Introducing [employer] [address] offers a voluntary care coordination program to provide high quality service and care to [facility] fee-for-service beneficiaries. Courtney Max was designed to help you enhance your health and well-being through the following services: ? Transitions of Care  support for individuals who are transitioning from one care setting to another (example: Hospital to home). ? Chronic and Complex Care Coordination  support for individuals and caregivers of those with serious or chronic illnesses or with more than one chronic (ongoing) condition and those who take a number of different medications. If you meet specific medical criteria, a [address] [place] [address] Rd may call you directly to coordinate your care with your primary care physician and your other care providers. For questions about the [facility] programs, please, contact your physicians office. For general questions or additional information about Accountable Care Organizations: 
Please visit www.medicare.gov/acos. html or call 1-800-MEDICARE (3-257.864.5993) TTY users should call 7-135.877.7813. [facility] Announcement We are excited to announce that we are making your provider's discharge notes available to you in [facility]. You will see these notes when they are completed and signed by the physician that discharged you from your recent hospital stay. If you have any questions or concerns about any information you see in [facility], please call the Health Information Department where you were seen or reach out to your Primary Care Provider for more information about your plan of care. Introducing [facility]! Dear Kaden Amaya: 
Thank you for requesting a [facility] account. Our records indicate that you already have an active [facility] account. You can access your account anytime at [email or web address] Did you know that you can access your hospital and ER discharge instructions at any time in [facility]? You can also review all of your test results from your hospital stay or ER visit. Additional Information If you have questions, please visit the Frequently Asked Questions section of the [employer] website at [email or web address] Remember, [employer] is NOT to be used for urgent needs. For medical emergencies, dial 911. Now available from your iPhone and Android! Providers Seen During Your Hospitalization Provider Specialty Primary office phone Lorenzo Zavala MD Cardiology [phone] Teri Herbert MD Internal Medicine [phone] Immunizations Administered for This Admission Name Date Influenza Vaccine (Quad) PF 12/3/2017 Your Primary Care Physician (PCP) Primary Care Physician Office Phone Office Fax Be Saenz [phone] You are allergic to the following Allergen Reactions Bumex (Bumetanide) Itching Recent Documentation Height Weight Breastfeeding? BMI OB Status Smoking Status 1.626 m 55 kg No 20.82 kg/m2 Postmenopausal Former Smoker Emergency Contacts Name Discharge Info Relation Home Work Mobile Margarita CAREGIVER [3] Spouse [3] [phone] Patient Belongings The following personal items are in your possession at time of discharge: 
  Dental Appliances: None  Visual Aid: Glasses (with )      Home Medications: None   Jewelry: None  Clothing: At bedside    Other Valuables: At bedside Please provide this summary of care documentation to your next provider. Signatures-by signing, you are acknowledging that this After Visit Summary has been reviewed with you and you have received a copy. Patient Signature:  ____________________________________________________________ Date:  ____________________________________________________________  
  
Raya Alba Provider Signature:  ____________________________________________________________ Date:  ____________________________________________________________

## 2017-12-01 NOTE — ROUTINE PROCESS
TRANSFER - OUT REPORT:    Verbal report given to Flaquito Godinez RN(name) on Yogi Mcintosh  being transferred to [facility](unit) for routine progression of care       Report consisted of patients Situation, Background, Assessment and   Recommendations(SBAR). Information from the following report(s) SBAR, Procedure Summary and MAR was reviewed with the receiving nurse. Lines:       Opportunity for questions and clarification was provided.       Patient transported with:   [email or web address]

## 2017-12-01 NOTE — PROGRESS NOTES
Sheaths pulled from pts right groin, pt had 6fr arterial and 7fr  Venous , pulled by Janell Chamberlain , pressure held for 20 minutes, pressure dressing applied no bleeding or hematoma formation noted to site.

## 2017-12-01 NOTE — PROGRESS NOTES
Cath holding summary    Patient escorted to cath holding from waiting area ambulatory, alert and oriented x 4, voicing no complaints. Changed into gown and placed on monitor. Lab results, med rec and H&P reviewed on chart. PIV x 2 inserted without difficulty. Family to bedside.

## 2017-12-01 NOTE — H&P
[address] [facility]  ROUTINE H AND PS    Name:  Sumeet Edge  MR#:  [id number]  :  1940  Account #:  [de-identified]  Date of Adm:  2017      CHIEF COMPLAINT: Noted to be anemic after cardiac catheterization. PRIMARY CARE PHYSICIAN: She is unassigned. HISTORY OF PRESENT ILLNESS: This is a 79-year-old white female  with a past medical history of chronic rheumatic heart disease. She is  status post 2 [employer] mechanical mitral valve replacements, most  recently in . She is on chronic anticoagulation with Coumadin. She has nonischemic cardiomyopathy with chronic systolic congestive  heart failure, EF of 30% to 35%, as well as moderate to severe aortic  insufficiency. She has never had a colonoscopy, nor a mammogram.  She states that her last Pap smear was in the year . She does not  have a primary care physician in the community. She was a  catheterized by Dr. Benito Coles this morning and noted to have no  significant obstructive coronary artery disease. She was noted to have  nonischemic cardiomyopathy with an EF of 30% to 35%. She is noted  to have elevated right-sided heart pressure, to be volume  overloaded and mildly elevated LV filling pressures. Her hemoglobin  was 7.6 and she was recommended for admission. She is noted to be  markedly microcytic and hyperchromic. At my interview, she denies  any recent fevers, sweats or chills. She has noted shortness of breath  at rest, as well as dyspnea on exertion. She has chronic 1-pillow  orthopnea and frequent episodes of PND. She denies any nausea,  vomiting, diarrhea, or constipation. She denies any burning with  urination. No hematuria. No hematochezia. No dark or tarry stools. No  maroon-colored stools. No coffee-grounds in her stool. She states that  she walks unassisted and denies any recent falls at home. She denies  any blurred vision or double vision. She denies any focal weakness.   She denies any rash, but states that she gets some itching after taking  Bumex and that she informed Dr. Dimple Daniel of this morning. Dr. Dimple Daniel has recommended admission as well as initiation of Lasix  40 mg IV twice daily. ALLERGIES: BUMEX CAUSES ITCHING.    HOME MEDICATIONS:  1. Norvasc 5 mg p.o. daily. 2. Aspirin 81 mg p.o. daily. 3. Bumex 2 mg p.o. twice daily. 4. Lovenox 60 mg subcut every 12 hours. 5. Metoprolol  mg p.o. daily. 6. K-Dur 20 mEq p.o. twice daily. 7. Vitamin E 400 units capsule p.o. daily. 8. Warfarin 6 mg p.o. every evening. PAST SURGICAL HISTORY:  1. Mitral valve replacement, [employer] Ceferino's mechanical, with revision in  . 2. Cardiac catheterization, 2 or 3 of them, most recently done today. No intervention. 3. She has had 3 pacemakers. SOCIAL HISTORY: She denies tobacco. She resides with her  . 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 She walks  unassisted. FAMILY HISTORY: Her mom had a history of CHF and her father has  a history of CHF as well. Her brother  from cancer of the bile duct  in his 76s. She denies any family history of stroke. PHYSICAL EXAMINATION  VITAL SIGNS: Temperature 98, pulse 68, blood pressure 120/44,  respiratory rate 16, saturating 96% on room air. GENERAL: She is awake, alert and oriented x4. She appears her  stated age, currently on bed rest after cath. No acute distress. Her   and another family member are at the bedside. HEENT: Normocephalic, atraumatic. Pupils equally round and reactive  to light. No scleral icterus. There is conjunctival pallor. Oropharynx with  dry mucous membranes, otherwise clear. Plates upper and lower. NECK: Supple. Neck veins flat. No cervical lymphadenopathy. No  carotid bruit. CARDIOVASCULAR: S1, S2, regular rate and rhythm. No murmur. LUNGS: Clear to auscultation bilateral anterior and infra-axillary fields. ABDOMEN: Soft, nontender, nondistended, normoactive bowel  sounds. EXTREMITIES: No edema.  Dorsalis pedis pulses 2+ bilaterally. Dressing to the right groin, no hematoma. NEUROLOGIC: Exam is limited as she is currently on bed rest, but she  appears grossly intact. SKIN: No rash. No lesions. LABORATORY DATA: WBC 7.4, hemoglobin and hematocrit 7.6 and  27, MCV 68.2, MCH 19.2, platelets 450. PT 15.7, INR 1.4. Chemistry is  pending at the time of this dictation. IMAGING: None. ASSESSMENT AND PLAN:  1. Anemia, microcytic, hypochromic. Will send stool for occult blood. Send iron profile to include ferritin. Check B12 and folate. GI to consult. I discussed the case with Dr. Ivy Erazo. 2. Chronic rheumatic heart disease, status post St[employer] Ceferino mechanical  mitral valve replacement. Will continue her on Lovenox and not resume  warfarin until cleared by Cardiology and Gastroenterology. 3. Acute on chronic systolic congestive heart failure with an ejection  fraction of 30% to 35%. She is on intravenous Lasix, strict I's and O's,  daily weights. Cardiology to follow this patient in consult. 4. Moderate to severe aortic insufficiency. 5. History of atrial fibrillation on Coumadin. 6. History of complete heart block status post dual Medtronic  pacemaker. 7. Chronic obstructive pulmonary disease. No acute exacerbation. 8. Hypertension. Continue her medications as ordered by Dr. Bety Hardin. 9. Long-term anticoagulation with warfarin. 10. Nonischemic cardiomyopathy, ejection fraction of 30% to 35%. 11. Health maintenance. She has never had a colonoscopy nor  mammogram. She states her last Pap smear was around the year  2000. 12. Deep venous thrombosis prophylaxis with treatment dose Lovenox. DISPOSITION: SHE IS A FULL CODE. Admit to telemetry. TIME SPENT: 70 minutes.     Please note, the patient does not have a PCP to whom to forward this  H and Harry Pate M.D.    Fuentes Palencia / Adolfo Al  D:  12/01/2017   11:35  T:  12/01/2017   12:19  Job #:  [id number]

## 2017-12-01 NOTE — IP AVS SNAPSHOT
Summary of Care Report The Summary of Care report has been created to help improve care coordination. Users with access to [facility] ([facility]) or [address] (Web-based application) may access additional patient information including the Discharge Summary. If you are not currently a [address] user and need more information, please call the number listed below in the [place] [address] section and ask to be connected with Medical Records. Facility Information Name Address Phone [address] [facility] [address] [facility] [place] [id number] [phone] Patient Information Patient Name Sex  Nury Plummer ([id number]) Female 1940 Discharge Information Admitting Provider Service Area Unit Miroslava Joseph MD /  & [facility] / [phone] Discharge Provider Discharge Date/Time Discharge Disposition Destination (none) 12/3/2017 18:00 (Pending) [facility] (none) Patient Language Language ENGLISH [13] Hospital Problems as of 12/3/2017  Reviewed: 2017  3:03 PM by Casi Dumont MD  
 None Non-Hospital Problems as of 12/3/2017  Reviewed: 2017  3:03 PM by Casi Dumont MD  
  
  
  
 Class Noted - Resolved Last Modified Active Problems Atrial fibrillation ([facility] [address]  Unknown - Present 2011 by Lisa Milian Entered by Lisa "[facility] Overview Signed 2011  8:42 AM by Lisa Miilan  
   on Coumadin Mitral valve stenosis and aortic valve insufficiency  Unknown - Present 2011 by Lisa Milian Entered by Lisa "[facility] Overview Signed 2011  9:01 AM by Lisa Milian  
   status post 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 Pacemaker  Unknown - Present 2011 by Lisa Milian Entered by Soco Kunz Overview Signed 8/19/2011  9:01 AM by Soco Kunz Medtronic dual-chamber COPD (chronic obstructive pulmonary disease) ([address]  Unknown - Present 8/19/2011 by Soco Kunz Entered by Soco Kunz Long term (current) use of anticoagulants  8/19/2011 - Present 8/19/2011 by Saadia Lara LPN Entered by Saadia Lara LPN Atrial flutter ([address]  9/15/2011 - Present 9/15/2011 by Imer Pandya MD  
  Entered by Imer Pandya MD  
  S/P MVR (mitral valve replacement)  10/6/2011 - Present 10/6/2011 by Imer Pandya MD  
  Entered by Imer Pandya MD  
  Chronic rheumatic heart disease  3/13/2015 - Present 3/13/2015 by Isamar Gilliam MD  
  Entered by Isamar Gilliam MD  
  Complete heart block [facility])  3/13/2015 - Present 3/13/2015 by Isamar Gilliam MD  
  Entered by Isamar Gilliam MD  
  AI (aortic insufficiency)  Unknown - Present 3/13/2015 by Isamar Gilliam MD  
  Entered by Isamar Gilliam MD  
  Overview Signed 3/13/2015  1:43 PM by Isamar Gilliam MD  
   Moderate to severe HTN (hypertension)  Unknown - Present 3/13/2015 by Isamar Gilliam MD  
  Entered by Isamar Gilliam MD  
  Dyslipidemia  7/8/2016 - Present 7/8/2016 by Isamar Gilliam MD  
  Entered by Isamar Gilliam MD  
  Diastolic CHF, [facility])  1/13/2017 - Present 1/13/2017 by Isamar Gilliam MD  
  Entered by Isamar Gilliam MD  
  Essential hypertension  6/1/2017 - Present 6/1/2017 by Isamar Gilliam MD  
  Entered by Isamar Gilliam MD  
  Nonrheumatic aortic valve insufficiency  11/9/2017 - Present 11/9/2017 by Isamar Gilliam MD  
  Entered by Isamar Gilliam MD  
  Acute combined systolic and diastolic heart failure ([address]  11/9/2017 - Present 11/9/2017 by Isamar Gilliam MD  
  Entered by Isamar Gilliam MD  
  
You are allergic to the following Allergen Reactions Bumex (Bumetanide) Itching Current Discharge Medication List  
  
START taking these medications Dose & Instructions Dispensing Information Comments  
 ascorbic acid (vitamin C) 250 mg tablet Commonly known as:  VITAMIN C Start taking on:  12/4/2017 Dose:  250 mg Take 1 Tab by mouth daily (with breakfast). Quantity:  30 Tab Refills:  2  
   
 docusate sodium 100 mg capsule Commonly known as:  Sahra Hernandez Dose:  100 mg Take 1 Cap by mouth daily (with breakfast) for 90 days. HOLD for loose stool. Quantity:  30 Cap Refills:  2  
   
 ferrous sulfate 325 mg (65 mg iron) tablet Start taking on:  12/4/2017 Dose:  325 mg Take 1 Tab by mouth daily (with breakfast). Quantity:  30 Tab Refills:  2 CONTINUE these medications which have CHANGED Dose & Instructions Dispensing Information Comments  
 potassium chloride SR 20 mEq tablet Commonly known as:  K-TAB What changed:  how much to take Dose:  40 mEq Take 2 Tabs by mouth two (2) times a day. Quantity:  360 Tab Refills:  3 CONTINUE these medications which have NOT CHANGED Dose & Instructions Dispensing Information Comments  
 amLODIPine 5 mg tablet Commonly known as:  Krishna Melvin Dose:  5 mg Take 1 Tab by mouth daily. Quantity:  90 Tab Refills:  3  
   
 aspirin delayed-release 81 mg tablet Dose:  81 mg Take 1 Tab by mouth daily. Quantity:  90 Tab Refills:  3  
   
 bumetanide 2 mg tablet Commonly known as:  Davidson Alexandru Dose:  2 mg Take 1 Tab by mouth two (2) times a day. Quantity:  180 Tab Refills:  3  
   
 enoxaparin 60 mg/0.6 mL injection Commonly known as:  LOVENOX Dose:  60 mg  
60 mg by SubCUTAneous route every twelve (12) hours. Quantity:  10 Syringe Refills:  0  
   
 lisinopril 5 mg tablet Commonly known as:  Penny Reasons Start taking on:  12/4/2017 Dose:  5 mg Take 1 Tab by mouth daily. Quantity:  30 Tab Refills:  2 metoprolol succinate 100 mg tablet Commonly known as:  TOPROL XL Dose:  100 mg Take 1 Tab by mouth daily. Quantity:  90 Tab Refills:  3  
   
 vitamin E 400 unit capsule Commonly known as:  [address] Dose:  400 Units Take 400 Units by mouth daily. Refills:  0  
   
 warfarin 6 mg tablet Commonly known as:  COUMADIN Dose:  6 mg Take 1 Tab by mouth daily. or as directed Quantity:  135 Tab Refills:  3 Current Immunizations Name Date Influenza Vaccine (Quad) PF 12/3/2017 Surgery Information ID Date/Time Status Primary Surgeon All Procedures Location [id number] 12/3/2017 Candis Macias MD ENDOSCOPY w biospy COLONOSCOPY w APC w polypectomy [facility] ENDOSCOPY Follow-up Information Follow up With Details Comments Contact Info None   None (395) Patient stated that they have no PCP Discharge Instructions DISCHARGE SUMMARY from Nurse PATIENT INSTRUCTIONS: 
 
After general anesthesia or intravenous sedation, for 24 hours or while taking prescription Narcotics: · Limit your activities · Do not drive and operate hazardous machinery · Do not make important personal or business decisions · Do  not drink alcoholic beverages · If you have not urinated within 8 hours after discharge, please contact your surgeon on call. Report the following to your surgeon: 
· Excessive pain, swelling, redness or odor of or around the surgical area · Temperature over 100.5 · Nausea and vomiting lasting longer than 4 hours or if unable to take medications · Any signs of decreased circulation or nerve impairment to extremity: change in color, persistent  numbness, tingling, coldness or increase pain · Any questions What to do at Home: 
Recommended activity: Activity as tolerated.  
 
If you experience any of the following symptoms fever 101 or greater, nausea, vomiting, diarrhea, shortness of breath, blood in urine or stool, any problems or concerns. Follow up with Gastroenterology (Dr. Chadd Boyd). Follow-up with Cardiology Dr. Keo Medina *  Please give a list of your current medications to your Primary Care Provider. *  Please update this list whenever your medications are discontinued, doses are 
    changed, or new medications (including over-the-counter products) are added. *  Please carry medication information at all times in case of emergency situations. These are general instructions for a healthy lifestyle: No smoking/ No tobacco products/ Avoid exposure to second hand smoke Surgeon General's Warning:  Quitting smoking now greatly reduces serious risk to your health. Obesity, smoking, and sedentary lifestyle greatly increases your risk for illness A healthy diet, regular physical exercise & weight monitoring are important for maintaining a healthy lifestyle You may be retaining fluid if you have a history of heart failure or if you experience any of the following symptoms:  Weight gain of 3 pounds or more overnight or 5 pounds in a week, increased swelling in our hands or feet or shortness of breath while lying flat in bed. Please call your doctor as soon as you notice any of these symptoms; do not wait until your next office visit. Recognize signs and symptoms of STROKE: 
 
F-face looks uneven A-arms unable to move or move unevenly S-speech slurred or non-existent T-time-call 911 as soon as signs and symptoms begin-DO NOT go Back to bed or wait to see if you get better-TIME IS BRAIN. Warning Signs of HEART ATTACK Call 911 if you have these symptoms: 
? Chest discomfort. Most heart attacks involve discomfort in the center of the chest that lasts more than a few minutes, or that goes away and comes back. It can feel like uncomfortable pressure, squeezing, fullness, or pain. ? Discomfort in other areas of the upper body.  Symptoms can include pain or discomfort in one or both arms, the back, neck, jaw, or stomach. ? Shortness of breath with or without chest discomfort. ? Other signs may include breaking out in a cold sweat, nausea, or lightheadedness. Don't wait more than five minutes to call [address]! Fast action can save your life. Calling 911 is almost always the fastest way to get lifesaving treatment. Emergency Medical Services staff can begin treatment when they arrive  up to an hour sooner than if someone gets to the hospital by car. Patient armband removed and shredded MyChart Activation Thank you for requesting access to [employer]. Please follow the instructions below to securely access and download your online medical record. Diveboard allows you to send messages to your doctor, view your test results, renew your prescriptions, schedule appointments, and more. How Do I Sign Up? 1. In your internet browser, go to [email or web address] 
2. Click on the First Time User? Click Here link in the Sign In box. You will be redirect to the New Member Sign Up page. 3. Enter your Diveboard Access Code exactly as it appears below. You will not need to use this code after youve completed the sign-up process. If you do not sign up before the expiration date, you must request a new code. Diveboard Access Code: Activation code not generated Current Diveboard Status: Active (This is the date your Diveboard access code will ) 4. Enter the last four digits of your Social Security Number (xxxx) and Date of Birth (mm/dd/yyyy) as indicated and click Submit. You will be taken to the next sign-up page. 5. Create a Diveboard ID. This will be your Diveboard login ID and cannot be changed, so think of one that is secure and easy to remember. 6. Create a D[employer]board password. You can change your password at any time. 7. Enter your Password Reset Question and Answer. This can be used at a later time if you forget your password. 8. Enter your e-mail address. You will receive e-mail notification when new information is available in [address] 9. Click Sign Up. You can now view and download portions of your medical record. 10. Click the Download Summary menu link to download a portable copy of your medical information. Additional Information If you have questions, please visit the Frequently Asked Questions section of the [employer] website at [email or web address] Remember, [employer] is NOT to be used for urgent needs. For medical emergencies, dial 911. The discharge information has been reviewed with the patient. The patient verbalized understanding. Discharge medications reviewed with the patient and appropriate educational materials and side effects teaching were provided. ___________________________________________________________________________________________________________________________________ Chart Review Routing History Recipient Method Report Sent By Dayton Tom MD  
Phone: [phone] In [address] [[id number]] 9/14/2011  3:12 PM 08/19/2011 Izzy Tom MD  
Phone: [phone] In [address] [place] [address] [[id number]] 5/1/2012  2:26 PM 04/30/2012 Izzy Tom MD  
Phone: [phone] In [facility] EKG CUSTOM REPORT Cristopher Jeronimo [[id number]] 5/6/2013  9:58 AM 05/02/2013 Chino Moore MD  
Phone: [phone] In [facility] IP Auto Routed Eugenia Wright MD [[id number]] 12/1/2017  8:07 PM 12/01/2017 Chadd Wiggins MD  
Fax: [phone] Phone: [phone] Fax IP Auto Routed Eugenia Wright  [phone] 12/1/2017  8:07 PM 12/01/2017 Chino Moore MD  
Phone: [phone] In Basket IP Auto Routed Eugenia Wright MD [[id number]] 12/2/2017  9:18 AM 12/02/2017 Chadd Wiggins MD  
Fax: [phone] Phone: [phone] Fax IP Auto Routed Ashlie Jefferson  [phone] 12/2/2017  9:18 AM 12/02/2017 Hannah Russell MD  
Fax: [phone] Phone: [phone] Fax [facility] CUSTOM LAB REPORT Hannah Russell MD [[id number]] 12/2/2017  3:36 PM 12/1/2017 [facility] LAB REPORT Hannah Russell MD [[id number]] 12/2/2017  3:36 PM 12/1/2017 [facility] LAB REPORT Hannah Russell MD [[id number]] 12/2/2017  3:36 PM 12/1/2017

## 2017-12-01 NOTE — IP AVS SNAPSHOT
[address] Patient: Lanette Zapien MRN: [id number] My Medications TAKE these medications as instructed Instructions Each Dose to Equal  
 Morning Noon Evening Bedtime  
 amLODIPine 5 mg tablet Commonly known as:  Jerome Stafford Your last dose was: Your next dose is: Take 1 Tab by mouth daily. 5 mg  
    
   
   
   
  
 ascorbic acid (vitamin C) 250 mg tablet Commonly known as:  VITAMIN C Start taking on:  2017 Your last dose was: Your next dose is: Take 1 Tab by mouth daily (with breakfast). 250 mg  
    
   
   
   
  
 aspirin delayed-release 81 mg tablet Your last dose was: Your next dose is: Take 1 Tab by mouth daily. 81 mg  
    
   
   
   
  
 bumetanide 2 mg tablet Commonly known as:  Mark Pulse Your last dose was: Your next dose is: Take 1 Tab by mouth two (2) times a day. 2 mg  
    
   
   
   
  
 docusate sodium 100 mg capsule Commonly known as:  Imelda Donate Your last dose was: Your next dose is: Take 1 Cap by mouth daily (with breakfast) for 90 days. HOLD for loose stool. 100 mg  
    
   
   
   
  
 enoxaparin 60 mg/0.6 mL injection Commonly known as:  LOVENOX Your last dose was: Your next dose is:    
   
   
 60 mg by SubCUTAneous route every twelve (12) hours. 60 mg  
    
   
   
   
  
 ferrous sulfate 325 mg (65 mg iron) tablet Start taking on:  2017 Your last dose was: Your next dose is: Take 1 Tab by mouth daily (with breakfast). 325 mg  
    
   
   
   
  
 lisinopril 5 mg tablet Commonly known as:  Constance Paez Start taking on:  2017 Your last dose was: Your next dose is: Take 1 Tab by mouth daily. 5 mg metoprolol succinate 100 mg tablet Commonly known as:  TOPROL XL Your last dose was: Your next dose is: Take 1 Tab by mouth daily. 100 mg  
    
   
   
   
  
 potassium chloride SR 20 mEq tablet Commonly known as:  K-TAB Your last dose was: Your next dose is: Take 2 Tabs by mouth two (2) times a day. 40 mEq  
    
   
   
   
  
 vitamin E 400 unit capsule Commonly known as:  [address] Your last dose was: Your next dose is: Take 400 Units by mouth daily. 400 Units  
    
   
   
   
  
 warfarin 6 mg tablet Commonly known as:  COUMADIN Your last dose was: Your next dose is: Take 1 Tab by mouth daily. or as directed 6 mg Where to Get Your Medications Information on where to get these meds will be given to you by the nurse or doctor. ! Ask your nurse or doctor about these medications  
  ascorbic acid (vitamin C) 250 mg tablet  
 docusate sodium 100 mg capsule  
 ferrous sulfate 325 mg (65 mg iron) tablet  
 lisinopril 5 mg tablet

## 2017-12-01 NOTE — ROUTINE PROCESS
TRANSFER - IN REPORT:    Verbal report received from [address] [place] [address]. (name) on Albino Sickle  being received from [facility] ([facility]) for ordered procedure      Report consisted of patients Situation, Background, Assessment and   Recommendations(SBAR). Information from the following report(s) SBAR and MAR was reviewed with the receiving nurse. Opportunity for questions and clarification was provided. Assessment completed upon patients arrival to unit and care assumed.

## 2017-12-01 NOTE — PROCEDURES
[address] [facility] CATH LAB    Name:  Danni Sullivan  MR#:  [id number]  :  1940  Account #:  [de-identified]  Date of Adm:  2017  Date of Service:  2017      PROCEDURES PERFORMED  1. Right heart catheterization with thermodilution technique. 2. Left heart catheterization, bilateral selective coronary angiography. 3. Moderate sedation. TOTAL TIME: 33 minutes. INDICATION: The patient is a 44-year-old female with history of  multiple cardiac surgeries including 2 separate mitral valve  replacements, known aortic insufficiency, recently diagnosed  cardiomyopathy with ejection fraction 30-35% with worsening  symptoms of decompensated heart failure. She has NYHA class 3  symptomatology. DESCRIPTION OF PROCEDURE: The patient was brought to the  cardiac catheterization laboratory for scheduled elective procedure. She was prepped and draped in the usual sterile fashion over both  groin areas. The patient received intravenous Versed 1 mg and  Fentanyl 25 mcg for adequate moderate sedation. Total moderate  sedation was 33 minutes. Once the patient was adequately sedated,  the right groin was locally anesthetized using 12-18 mL of 1%  Xylocaine. Venous access was achieved via the right common femoral  vein and a short 7-Slovenian sheath was placed. Arterial access was  achieved via the right common femoral artery and a short 6-Slovenian  sheath was placed. The right heart catheterization was performed in  the usual manner using a 7-Slovenian [place]aca [place]-Abel Catheter. The catheter was advanced up to the pulmonary artery position and  hemodynamics were recorded. Cardiac outputs were measured using  both Adolfo technique and thermodilution technique. Catheter was  withdrawn. Various right-sided pressures were recorded and the  catheter was eventually removed. Through the arterial sheath, a 6-  [place] JR4 catheter was used to cross the aortic valve with a  guidewire.  Hemodynamics were recorded. This catheter was then  pulled back across the valve and used to cannulate the right coronary,  which was viewed in multiple angiographic views. This was changed  out over a guidewire for the 6-Estonian. Esther Elie catheter was used to  cannulate the left main coronary artery disease in multiple  angiographic views. Catheter and guidewire were then removed. Arteriotomy site was visualized via hand injection through the sheath  and due to high bifurcation of the patient's SFA and profunda vessel,  closure device was not used. The patient was transferred to the  holding area in stable condition with manual hemostasis to be used. COMPLICATIONS: None apparent. ESTIMATED BLOOD LOSS: Less than 20 mL. RADIATION DOSE: 483 mGy. TOTAL CONTRAST USED: 70 mL of Isovue. SPECIMENS REMOVED: None. HEMODYNAMIC DATA: Pulmonary capillary wedge pressure was 22  mmHg, RA pressure was 15 mmHg, RV pressure was 47/4, PA  pressure was 48/17 with a mean of 31, LVEDP was 21 mmHg. There  is no gradient across the aortic valve. Aortic pressure was 128/40 with  a mean of 74. Cardiac output using the thermodilution technique was  3.8 liters per minute. Cardiac index corresponded to 2.4 liters per  minute per meter squared. Using the Adolfo technique, cardiac output  was 3.0 liters per minute and the index was 1.9 liters per minute per  meter squared. Pulmonary vascular resistance was calculated at 3.0  woods units. DESCRIPTION OF ANGIOGRAPHY  1. Right coronary artery was a medium caliber, dominant vessel, gave  rise to a small acute marginal branch, small PDA and a very small  PLB. The RCA proper and its branches contained very mild luminal  irregularities. 2. Left main coronary artery was a short length moderate caliber  vessel, trifurcating the LAD, left circumflex, and ramus intermedius  branch. The LAD proper contained a mild distal 20% stenosis.  The  LAD was a medium caliber vessel, reached the cardiac apex, gave rise  to 2 small diagonal branches. The LAD proper had a 20-30% proximal  stenosis without any obstructive lesions elsewhere. Both diagonal  branches had luminal irregularities. 3. Ramus intermedius branch was a medium caliber vessel, ran down  the anterolateral wall, had mild luminal irregularities. 4. Left circumflex was a medium caliber vessel. It gave rise to a  medium caliber and long branching obtuse marginal branch and also a  posterolateral branch. The left circumflex proper and the PLB had mild  luminal irregularities. The obtuse marginal branch had a mild  nonobstructive long 20% lesion in its proximal portion. CONCLUSIONS  1. No significant obstructive coronary artery disease identified. 2. Nonischemic cardiomyopathy based on recent echocardiogram with  an ejection fraction in the 30-35% range. 3. Elevated right-sided heart pressures consistent with volume  overload. 4. Mildly elevated left ventricular filling pressures.         Ashli Cummins MD    MR / GL  D:  12/01/2017   09:51  T:  12/01/2017   10:45  Job #:  [id number]

## 2017-12-01 NOTE — CONSULTS
[email or web address]  [phone]    Impression:   1.microcytic  Anemia- No overt Gi hemorrhage  -pend occult blood stool  -today h/h 7.6/27  2. CM- EF 30-35%  -12/1/17- S/p cardiac cath- non ischemic CM EF 30-35%. No significant obstructive coronary artery disease  3. H/O rheumatic heart disease- S/p mechanical prosthetic mitral valve replacement  4. H/O CHB- PPM  5. PAF- on wafarin- now on hold over the last 6 days  6. COPD  7. HTN      Plan:     1. Cont monitor h/h transfuse hgb<7  Await further recommendation from attending. Chief Complaint: anemia      HPI:  Jessica Moreira is a 78 y.o. female who is being seen on consult for Anemia. Pt on warfarin at home for mechanical valve. Extensive cardiac history noted above. Pt denies abdominal pain, hematemesis, melena, hematochezia. Pt has never had a colonoscopy. PMH:   Past Medical History:   Diagnosis Date    AI (aortic insufficiency)     Moderate to severe    Atrial fibrillation (HCC)     on Coumadin    Cardiac echocardiogram 11/08/2016    EF 50%. No WMA. Mild LVH. Gr 2 DDfx. Mild RVE. RVSP 43 mmHg. Severe LAE. Mild ARTI. Prosthetic MV w/normal fx. Mod-severe AI (mean grad 16 mmHg; prev 9 mmHg). Mod TR.  Cardiac Holter monitoring 09/29/2011    Baseline sinus rhythm w/ventricular tracking. Occasional premature ventricular ectopic beats likely correspond w/symptoms.  Complete heart block (HCC)     COPD (chronic obstructive pulmonary disease) ([address]     HTN (hypertension)     Long term (current) use of anticoagulants 8/19/2011    Mitral valve stenosis and aortic valve insufficiency     status post 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     Pacemaker     Medtronic dual-chamber        PSH:   Past Surgical History:   Procedure Laterality Date    HX HEART VALVE SURGERY      [facility] MVR 1999; re-do in 2003    HX OTHER SURGICAL  12/13/13 Generator Change for Pacemaker    HX PACEMAKER      Medtronic dual-chamber       Social HX:   Social History     Social History    Marital status:      Spouse name: N/A    Number of children: N/A    Years of education: N/A     Occupational History    Not on file. Social History Main Topics    Smoking status: Former Smoker     Packs/day: 0.50     Years: 30.00     Quit date: 8/19/1999    Smokeless tobacco: Never Used    Alcohol use No    Drug use: No    Sexual activity: Not on file     Other Topics Concern    Not on file     Social History Narrative       FHX:   Family History   Problem Relation Age of Onset    Heart Failure Mother     Heart Failure Father        Allergy:   No Known Allergies    Home Medications:     Prescriptions Prior to Admission   Medication Sig    enoxaparin (LOVENOX) 60 mg/0.6 mL injection 60 mg by SubCUTAneous route every twelve (12) hours.  metoprolol succinate (TOPROL XL) 100 mg tablet Take 1 Tab by mouth daily.  amLODIPine (NORVASC) 5 mg tablet Take 1 Tab by mouth daily.  bumetanide (BUMEX) 2 mg tablet Take 1 Tab by mouth two (2) times a day.  aspirin delayed-release 81 mg tablet Take 1 Tab by mouth daily.  vitamin E (AQUA GEMS) 400 unit capsule Take 400 Units by mouth daily.  lisinopril (PRINIVIL, ZESTRIL) 5 mg tablet Take 1 Tab by mouth daily.  warfarin (COUMADIN) 6 mg tablet Take 1 Tab by mouth daily. or as directed    potassium chloride SR (K-TAB) 20 mEq tablet Take 2 Tabs by mouth two (2) times a day. (Patient taking differently: Take 20 mEq by mouth two (2) times a day.)    albuterol (PROVENTIL HFA) 90 mcg/actuation inhaler Take 1 puff by inhalation every four (4) hours as needed. Review of Systems:     A fourteen point review of systems was obtained, and was negative except per HPI.     Visit Vitals    /44    Pulse 68    Temp 98 °F (36.7 °C)    Resp 18    Ht 5' 4\" (1.626 m)    Wt 54.4 kg (120 lb)    SpO2 96%    Breastfeeding No    BMI 20.6 kg/m2       Physical Assessment:     constitutional: appearance: well developed, well nourished, in no acute distress. skin: no rashes, jaundice  eyes: inspection: normal conjunctivae and lids; no scleral icterus pupils: equal, round, reactive to light; extraocular movements intact  ENMT: mouth: mucous membranes moist, no thrush  neck:  no masses or tenderness; normal range of motion  respiratory: breath sounds clear, no wheeze/rales/rhonchi  cardiovascular: normal rate, regular rhythm without murmur  abdominal: soft, bowel sounds present, non-tender to palpation without rebound or guarding; no appreciable mass; no appreciable hepatosplenomegaly; no appreciable fluid wave  extremities: no clubbing, cyanosis, edema  neurologic:  Cranial nerves II-XII grossly intact; no asterixis   psychiatric:  oriented to time, space and person. Basic Metabolic Profile   No results for input(s): NA, K, CL, CO2, BUN, GLU, CA, MG, PHOS in the last 72 hours. No lab exists for component: CREAT      CBC w/Diff    Recent Labs      12/01/17   0745   WBC  7.4   RBC  3.96*   HGB  7.6*   HCT  27.0*   MCV  68.2*   MCH  19.2*   MCHC  28.1*   RDW  19.9*   PLT  285    No results for input(s): GRANS, LYMPH, EOS, PRO, MYELO, METAS, BLAST in the last 72 hours. No lab exists for component: MONO, BASO     Hepatic Function   No results for input(s): ALB, TP, TBILI, GPT, SGOT, AP, AML, LPSE in the last 72 hours. No lab exists for component: DBILI Charlesetta Severance, NP  [facility] [address], [address]  [email or web address]

## 2017-12-01 NOTE — PROGRESS NOTES
Albuterol 2.5 mg nebulizer was therapeutically interchanged for albuterol 90 mcg inhaler per the P&T Committee approved Therapeutic Interchanges Policy.      Elver Sampson, [facility], Pharmacist  12/1/2017 11:32 AM

## 2017-12-02 LAB
ANION GAP SERPL CALC-SCNC: 9 MMOL/L (ref 3–18)
BASOPHILS # BLD: 0.1 K/UL (ref 0–0.1)
BASOPHILS NFR BLD: 1 % (ref 0–2)
BUN SERPL-MCNC: 26 MG/DL (ref 7–18)
BUN/CREAT SERPL: 29 (ref 12–20)
CALCIUM SERPL-MCNC: 8 MG/DL (ref 8.5–10.1)
CHLORIDE SERPL-SCNC: 104 MMOL/L (ref 100–108)
CO2 SERPL-SCNC: 26 MMOL/L (ref 21–32)
CREAT SERPL-MCNC: 0.89 MG/DL (ref 0.6–1.3)
DIFFERENTIAL METHOD BLD: ABNORMAL
EOSINOPHIL # BLD: 0.1 K/UL (ref 0–0.4)
EOSINOPHIL NFR BLD: 1 % (ref 0–5)
ERYTHROCYTE [DISTWIDTH] IN BLOOD BY AUTOMATED COUNT: 20.1 % (ref 11.6–14.5)
GLUCOSE SERPL-MCNC: 97 MG/DL (ref 74–99)
HCT VFR BLD AUTO: 26.1 % (ref 35–45)
HGB BLD-MCNC: 7.1 G/DL (ref 12–16)
LYMPHOCYTES # BLD: 0.9 K/UL (ref 0.9–3.6)
LYMPHOCYTES NFR BLD: 17 % (ref 21–52)
MAGNESIUM SERPL-MCNC: 2.3 MG/DL (ref 1.6–2.6)
MCH RBC QN AUTO: 18.4 PG (ref 24–34)
MCHC RBC AUTO-ENTMCNC: 27.2 G/DL (ref 31–37)
MCV RBC AUTO: 67.8 FL (ref 74–97)
MONOCYTES # BLD: 0.8 K/UL (ref 0.05–1.2)
MONOCYTES NFR BLD: 16 % (ref 3–10)
NEUTS SEG # BLD: 3.4 K/UL (ref 1.8–8)
NEUTS SEG NFR BLD: 65 % (ref 40–73)
PHOSPHATE SERPL-MCNC: 3.6 MG/DL (ref 2.5–4.9)
PLATELET # BLD AUTO: 276 K/UL (ref 135–420)
PLATELET COMMENTS,PCOM: ABNORMAL
PMV BLD AUTO: 9.5 FL (ref 9.2–11.8)
POTASSIUM SERPL-SCNC: 3.6 MMOL/L (ref 3.5–5.5)
RBC # BLD AUTO: 3.85 M/UL (ref 4.2–5.3)
RBC MORPH BLD: ABNORMAL
SODIUM SERPL-SCNC: 139 MMOL/L (ref 136–145)
WBC # BLD AUTO: 5.3 K/UL (ref 4.6–13.2)

## 2017-12-02 PROCEDURE — 65270000029 HC RM PRIVATE

## 2017-12-02 PROCEDURE — 74011250636 HC RX REV CODE- 250/636: Performed by: INTERNAL MEDICINE

## 2017-12-02 PROCEDURE — 74011000250 HC RX REV CODE- 250: Performed by: INTERNAL MEDICINE

## 2017-12-02 PROCEDURE — 80048 BASIC METABOLIC PNL TOTAL CA: CPT | Performed by: HOSPITALIST

## 2017-12-02 PROCEDURE — 74011250637 HC RX REV CODE- 250/637: Performed by: INTERNAL MEDICINE

## 2017-12-02 PROCEDURE — 83735 ASSAY OF MAGNESIUM: CPT | Performed by: HOSPITALIST

## 2017-12-02 PROCEDURE — 74011250637 HC RX REV CODE- 250/637: Performed by: HOSPITALIST

## 2017-12-02 PROCEDURE — 84100 ASSAY OF PHOSPHORUS: CPT | Performed by: HOSPITALIST

## 2017-12-02 PROCEDURE — 86900 BLOOD TYPING SEROLOGIC ABO: CPT | Performed by: HOSPITALIST

## 2017-12-02 PROCEDURE — 74011250636 HC RX REV CODE- 250/636: Performed by: HOSPITALIST

## 2017-12-02 PROCEDURE — 86920 COMPATIBILITY TEST SPIN: CPT | Performed by: HOSPITALIST

## 2017-12-02 PROCEDURE — P9016 RBC LEUKOCYTES REDUCED: HCPCS | Performed by: HOSPITALIST

## 2017-12-02 PROCEDURE — 36415 COLL VENOUS BLD VENIPUNCTURE: CPT | Performed by: HOSPITALIST

## 2017-12-02 PROCEDURE — 36430 TRANSFUSION BLD/BLD COMPNT: CPT

## 2017-12-02 PROCEDURE — 85025 COMPLETE CBC W/AUTO DIFF WBC: CPT | Performed by: HOSPITALIST

## 2017-12-02 PROCEDURE — 74011000258 HC RX REV CODE- 258: Performed by: HOSPITALIST

## 2017-12-02 RX ORDER — METOPROLOL TARTRATE 25 MG/1
25 TABLET, FILM COATED ORAL EVERY 12 HOURS
Status: DISCONTINUED | OUTPATIENT
Start: 2017-12-02 | End: 2017-12-03 | Stop reason: HOSPADM

## 2017-12-02 RX ORDER — SODIUM CHLORIDE 9 MG/ML
250 INJECTION, SOLUTION INTRAVENOUS AS NEEDED
Status: DISCONTINUED | OUTPATIENT
Start: 2017-12-02 | End: 2017-12-03 | Stop reason: HOSPADM

## 2017-12-02 RX ORDER — FUROSEMIDE 10 MG/ML
40 INJECTION INTRAMUSCULAR; INTRAVENOUS 2 TIMES DAILY
Status: DISCONTINUED | OUTPATIENT
Start: 2017-12-02 | End: 2017-12-03

## 2017-12-02 RX ADMIN — AMLODIPINE BESYLATE 5 MG: 5 TABLET ORAL at 10:11

## 2017-12-02 RX ADMIN — ENOXAPARIN SODIUM 60 MG: 60 INJECTION SUBCUTANEOUS at 06:36

## 2017-12-02 RX ADMIN — Medication 250 MG: at 08:56

## 2017-12-02 RX ADMIN — FERROUS SULFATE TAB 325 MG (65 MG ELEMENTAL FE) 325 MG: 325 (65 FE) TAB at 08:55

## 2017-12-02 RX ADMIN — IRON SUCROSE 100 MG: 20 INJECTION, SOLUTION INTRAVENOUS at 21:37

## 2017-12-02 RX ADMIN — LISINOPRIL 5 MG: 5 TABLET ORAL at 10:11

## 2017-12-02 RX ADMIN — IRON SUCROSE 100 MG: 20 INJECTION, SOLUTION INTRAVENOUS at 02:23

## 2017-12-02 RX ADMIN — METOPROLOL TARTRATE 25 MG: 25 TABLET ORAL at 10:11

## 2017-12-02 RX ADMIN — ASPIRIN 81 MG: 81 TABLET, COATED ORAL at 09:04

## 2017-12-02 RX ADMIN — FUROSEMIDE 40 MG: 10 INJECTION, SOLUTION INTRAMUSCULAR; INTRAVENOUS at 19:14

## 2017-12-02 RX ADMIN — METOPROLOL TARTRATE 25 MG: 25 TABLET ORAL at 21:37

## 2017-12-02 RX ADMIN — POLYETHYLENE GLYCOL 3350, SODIUM SULFATE ANHYDROUS, SODIUM BICARBONATE, SODIUM CHLORIDE, POTASSIUM CHLORIDE 4000 ML: 236; 22.74; 6.74; 5.86; 2.97 POWDER, FOR SOLUTION ORAL at 18:27

## 2017-12-02 NOTE — PROGRESS NOTES
Problem: Falls - Risk of  Goal: *Absence of Falls  Document Jc Fall Risk and appropriate interventions in the flowsheet.    Outcome: Progressing Towards Goal  Fall Risk Interventions:            Medication Interventions: Teach patient to arise slowly

## 2017-12-02 NOTE — PROGRESS NOTES
[email or web address]  [phone]    Gastroenterology follow up-Progress note    Impression:  1. Anemia fe deficiency-presumed GI bleeding. No over blood loss. No previous C-scope, remote history of EGD-results unknown, getting transfused today  2. Need for anticoagulation     Plan:  1. Will plan for EGD and Colonoscopy tomorrow    Chief Complaint: F/u anemia, bleeding    Subjective:  No overt blood loss    ROS: Denies any fevers, chills, rash.      Eyes: conjunctiva normal, EOM normal   Neck: ROM normal, supple and trachea normal   Cardiovascular: heart normal, intact distal pulses, normal rate and regular rhythm   Pulmonary/Chest Wall: breath sounds normal and effort normal   Abdominal: appearance normal, bowel sounds normal and soft, non-acute, non-tender     Patient Active Problem List   Diagnosis Code    Atrial fibrillation (HCC) I48.91    Mitral valve stenosis and aortic valve insufficiency I08.0    Pacemaker Z95.0    COPD (chronic obstructive pulmonary disease) ([facility] [address] J44.9    Long term (current) use of anticoagulants Z79.01    Atrial flutter (HCC) I48.92    S/P MVR (mitral valve replacement) Z95.2    Chronic rheumatic heart disease I09.9    Complete heart block (HCC) I44.2    AI (aortic insufficiency) I35.1    HTN (hypertension) I10    Dyslipidemia G73.4    Diastolic CHF, chronic (HCC) I50.32    Essential hypertension I10    Nonrheumatic aortic valve insufficiency I35.1    Acute combined systolic and diastolic heart failure (HCC) I50.41    Anemia D64.9    Symptomatic anemia D64.9         Visit Vitals    /54 (BP 1 Location: Left arm, BP Patient Position: At rest)    Pulse 82    Temp 97.2 °F (36.2 °C)    Resp 20    Ht 5' 4\" (1.626 m)    Wt 54.4 kg (120 lb)    SpO2 97%    Breastfeeding No    BMI 20.6 kg/m2         No intake or output data in the 24 hours ending 12/02/17 1535    CBC w/Diff    Lab Results   Component Value Date/Time    WBC 5.3 12/02/2017 02:24 AM    RBC 3.85 (L) 12/02/2017 02:24 AM    HGB 7.1 (L) 12/02/2017 02:24 AM    HCT 26.1 (L) 12/02/2017 02:24 AM    MCV 67.8 (L) 12/02/2017 02:24 AM    MCH 18.4 (L) 12/02/2017 02:24 AM    MCHC 27.2 (L) 12/02/2017 02:24 AM    RDW 20.1 (H) 12/02/2017 02:24 AM     12/02/2017 02:24 AM    Lab Results   Component Value Date/Time    GRANS 65 12/02/2017 02:24 AM    LYMPH 17 (L) 12/02/2017 02:24 AM    EOS 1 12/02/2017 02:24 AM    BASOS 1 12/02/2017 02:24 AM      Basic Metabolic Profile   Recent Labs      12/02/17   0224   NA  139   K  3.6   CL  104   CO2  26   BUN  26*   CA  8.0*   MG  2.3   PHOS  3.6        Hepatic Function    Lab Results   Component Value Date/Time    ALB 3.7 11/21/2017 12:30 PM    TP 6.9 11/21/2017 12:30 PM    AP 86 11/21/2017 12:30 PM    Lab Results   Component Value Date/Time    SGOT 26 11/21/2017 12:30 PM          Coags   Recent Labs      12/01/17   0730   PTP  16.7*   INR  1.4*               Vitor Fry MD    [facility]. [email or web address]  Phone: [phone]  Pager: [phone]  Cell: [phone]. [email or web address]

## 2017-12-02 NOTE — PROGRESS NOTES
[facility]  -  Progress Note      Patient: Riley Man MRN: [id number]  SSN: [id number]    YOB: 1940  Age: 78 y.o. Sex: female      Admit Date: 12/1/2017    Assessment:     -  Cardiomyopathy: Worsening with last EF 35%  - Cardiac cath 12/1/17: NO obstructive CAD, Elevated filling pressure   AI (aortic insufficiency): Moderate to severe   Atrial fibrillation (HCC)  on chronic coumadin, currently lovenox  -  Anemia: GI work up pending.  seeing patient  -  S/P PPM for  Complete heart block (HCC)    COPD (chronic obstructive pulmonary disease) (HCC)    HTN (hypertension)    Mitral valve stenosis and aortic valve insufficiency    status post [facility] 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  Pacemaker    Medtronic dual-chamber     Plan:     - Start lasix 40 mg IV BID X 4 doses. Patient takes bumex at home but thinks she has significant itching with it. - Strict I/O  - Continue Lovenox bridge while awaiting GI work up  - Continue BB,lisinopril and amlodipine. Subjective:     No new complaints.    Dyspnea, improved  NO CP    Objective:      Patient Vitals for the past 8 hrs:   Temp Pulse Resp BP SpO2   12/02/17 1613 96.7 °F (35.9 °C) 89 16 121/69 -   12/02/17 1602 96.7 °F (35.9 °C) 71 19 128/55 99 %   12/02/17 1441 97.2 °F (36.2 °C) - 20 116/54 -   12/02/17 1348 97.2 °F (36.2 °C) 82 18 112/42 97 %   12/02/17 1315 96.8 °F (36 °C) 73 16 119/51 97 %   12/02/17 1259 97.5 °F (36.4 °C) 78 15 108/56 98 %   12/02/17 1235 97.1 °F (36.2 °C) 76 16 120/51 99 %   12/02/17 1150 97.5 °F (36.4 °C) 77 18 110/53 97 %   12/02/17 0906 97.8 °F (36.6 °C) 79 18 124/48 97 %   12/02/17 0848 - 72 - 128/57 -         Patient Vitals for the past 96 hrs:   Weight   12/01/17 0708 120 lb (54.4 kg)       No intake or output data in the 24 hours ending 12/02/17 1616    Physical Exam:  General:  alert, cooperative, no distress, appears stated age  Neck:  no JVD  Lungs:  Few bibasilar rales  Heart:  regular rate and rhythm, MV click  Abdomen:  abdomen is soft without significant tenderness, masses, organomegaly or guarding  Extremities:  Trace edema  Data Review:     Labs: Results:       Chemistry Recent Labs      12/02/17 0224 12/01/17   1517   GLU  97  112*   NA  139  137   K  3.6  4.4   CL  104  104   CO2  26  28   BUN  26*  24*   CREA  0.89  1.01   CA  8.0*  8.5   MG  2.3  2.3   PHOS  3.6  3.3   AGAP  9  5   BUCR  29*  24*      CBC w/Diff Recent Labs      12/02/17 0224 12/01/17   1517  12/01/17   0745   WBC  5.3   --   7.4   RBC  3.85*   --   3.96*   HGB  7.1*  7.6*  7.6*   HCT  26.1*  27.6*  27.0*   PLT  276   --   285   GRANS  65   --    --    LYMPH  17*   --    --    EOS  1   --    --       Cardiac Enzymes No results found for: CPK, CK, CKMMB, CKMB, RCK3, CKMBT, CKNDX, CKND1, ДМИТРИЙ, TROPT, TROIQ, ULISES, TROPT, TNIPOC, BNP, BNPP   Coagulation Recent Labs      12/01/17   0730   PTP  16.7*   INR  1.4*       Lipid Panel Lab Results   Component Value Date/Time    Cholesterol, total 231 03/24/2015 12:00 AM    HDL Cholesterol 50 03/24/2015 12:00 AM    LDL, calculated 160 03/24/2015 12:00 AM    VLDL, calculated 21 03/24/2015 12:00 AM    Triglyceride 106 03/24/2015 12:00 AM      BNP No results found for: BNP, BNPP, XBNPT   Liver Enzymes No results for input(s): TP, ALB, TBIL, AP, SGOT, GPT in the last 72 hours.     No lab exists for component: DBIL   Digoxin    Thyroid Studies No results found for: T4, T3U, TSH, TSHEXT

## 2017-12-02 NOTE — PROGRESS NOTES
Hospitalist Progress Note    Patient: Daniela Clark MRN: [id number]  CSN: [id number]    YOB: 1940  Age: 78 y.o. Sex: female    DOA: 12/1/2017 LOS:  LOS: 1 day          Called by nsg for low diastolic bp. Am dose of lasix, ACE, norvasc held. Hb 7.1. She is tolerating BT w/o itching or shortness of breath. She had BT in the past w/o difficulty. She denies chest pain. She agrees to egd and colonoscopy. Assessment/Plan     1. Iron deficiency anemia - supplement po and iv. 2. Chronic rheumatic heart disease, status post St. Ceferino mechanical mitral valve replacement. continue Lovenox and continue to hold warfarin until cleared by Cardiology and Gastroenterology. 3. Acute on chronic systolic CHF with EF 52% - 35%. on intravenous Lasix, strict I's and O's,  daily weights. Cardiology input appreciated. 4. Moderate to severe aortic insufficiency. 5. History of atrial fibrillation on Coumadin, and BB. 6. Hx complete heart block s/p dual Medtronic  pacemaker. 7. COPD. No acute exacerbation. 8. Hypertension. On home meds w/ holding parameters. Metoprolol changed to lower dose, bid dosing. 9. Long-term anticoagulation with warfarin. 10. [address] ejection fraction of 30% to 35%. 11. Health maintenance. never had colonoscopy nor  mammogram. last Pap smear was ~ year 2000. 12. Anemia requiring transfusion - 2 units 12/2/17  13. dvt prophylaxis with treatment dose Lovenox. 14. FULL CODE. Tele.     Additional Notes:      Case discussed with:  [x]Patient  []Family  [x]Nursing  []Case Management  DVT Prophylaxis:  [x]Lovenox  []Hep SQ  []SCDs  []Coumadin   []On Heparin gtt    Vital signs/Intake and Output:  Visit Vitals    /48 (BP 1 Location: Left arm, BP Patient Position: At rest)    Pulse 79    Temp 97.8 °F (36.6 °C)    Resp 18    Ht 5' 4\" (1.626 m)    Wt 54.4 kg (120 lb)    SpO2 97%    Breastfeeding No    BMI 20.6 kg/m2     Current Shift:     Last three shifts:  11/30 1901 - 12/02 0700  In: 40 [I.V.:40]  Out: -     Awake alert and oriented sitting up in bed getting BT. [place]. RRR  cta b.l  Soft nt nd nabs  No edema. dp 2+ b.l  No focal deficit  No rash. Medications Reviewed      Labs: Results:       Chemistry Recent Labs      12/02/17 0224 12/01/17   1517   GLU  97  112*   NA  139  137   K  3.6  4.4   CL  104  104   CO2  26  28   BUN  26*  24*   CREA  0.89  1.01   CA  8.0*  8.5   AGAP  9  5   BUCR  29*  24*      CBC w/Diff Recent Labs      12/02/17 0224 12/01/17   1517  12/01/17   0745   WBC  5.3   --   7.4   RBC  3.85*   --   3.96*   HGB  7.1*  7.6*  7.6*   HCT  26.1*  27.6*  27.0*   PLT  276   --   285   GRANS  65   --    --    LYMPH  17*   --    --    EOS  1   --    --       Cardiac Enzymes No results for input(s): CPK, CKND1, ДМИТРИЙ in the last 72 hours. No lab exists for component: CKRMB, TROIP   Coagulation Recent Labs      12/01/17   0730   PTP  16.7*   INR  1.4*       Lipid Panel Lab Results   Component Value Date/Time    Cholesterol, total 231 03/24/2015 12:00 AM    HDL Cholesterol 50 03/24/2015 12:00 AM    LDL, calculated 160 03/24/2015 12:00 AM    VLDL, calculated 21 03/24/2015 12:00 AM    Triglyceride 106 03/24/2015 12:00 AM      BNP No results for input(s): BNPP in the last 72 hours. Liver Enzymes No results for input(s): TP, ALB, TBIL, AP, SGOT, GPT in the last 72 hours. No lab exists for component: DBIL   Thyroid Studies No results found for: T4, T3U, TSH, TSHEXT     Procedures/imaging: see electronic medical records for all procedures/Xrays and details which were not copied into this note but were reviewed prior to creation of Plan.

## 2017-12-02 NOTE — PROGRESS NOTES
Obtained consent for blood transfusion. Explained to patient sings and symptoms of blood  transfusion reaction.

## 2017-12-02 NOTE — PROGRESS NOTES
Sitting up in bed watching TV. A/OX4. Denies pain. Appears to be in no resp. distress at present time. Post op day 1. Dressing to right groin area dry intact. Call bell phone within reach. Side rails X3. Call bell phone within reach.

## 2017-12-02 NOTE — PROGRESS NOTES
2nd unit of blood checked with Negar Tripathi. Nico 2nd unit of PRBC. Blood infusing without any problems.

## 2017-12-02 NOTE — ROUTINE PROCESS
Bedside and Verbal shift change report given to Adeline Almanza RN (oncoming nurse) by Buck Frank RN (offgoing nurse). Report included the following information SBAR, Kardex, Procedure Summary, Intake/Output, MAR and Recent Results.

## 2017-12-03 ENCOUNTER — ANESTHESIA (OUTPATIENT)
Dept: ENDOSCOPY | Age: 77
DRG: 811 | End: 2017-12-03
Payer: MEDICARE

## 2017-12-03 ENCOUNTER — ANESTHESIA EVENT (OUTPATIENT)
Dept: ENDOSCOPY | Age: 77
DRG: 811 | End: 2017-12-03
Payer: MEDICARE

## 2017-12-03 VITALS
TEMPERATURE: 98.9 F | HEIGHT: 64 IN | DIASTOLIC BLOOD PRESSURE: 53 MMHG | SYSTOLIC BLOOD PRESSURE: 110 MMHG | OXYGEN SATURATION: 98 % | RESPIRATION RATE: 18 BRPM | BODY MASS INDEX: 20.71 KG/M2 | HEART RATE: 89 BPM | WEIGHT: 121.3 LBS

## 2017-12-03 PROBLEM — D64.9 SYMPTOMATIC ANEMIA: Status: RESOLVED | Noted: 2017-12-01 | Resolved: 2017-12-03

## 2017-12-03 PROBLEM — D64.9 ANEMIA: Status: RESOLVED | Noted: 2017-12-01 | Resolved: 2017-12-03

## 2017-12-03 LAB
ABO + RH BLD: NORMAL
ANION GAP SERPL CALC-SCNC: 9 MMOL/L (ref 3–18)
BASOPHILS # BLD: 0.1 K/UL (ref 0–0.1)
BASOPHILS NFR BLD: 1 % (ref 0–2)
BLD PROD TYP BPU: NORMAL
BLD PROD TYP BPU: NORMAL
BLOOD GROUP ANTIBODIES SERPL: NORMAL
BPU ID: NORMAL
BPU ID: NORMAL
BUN SERPL-MCNC: 14 MG/DL (ref 7–18)
BUN/CREAT SERPL: 19 (ref 12–20)
CALCIUM SERPL-MCNC: 8.4 MG/DL (ref 8.5–10.1)
CALLED TO:,BCALL1: NORMAL
CHLORIDE SERPL-SCNC: 103 MMOL/L (ref 100–108)
CO2 SERPL-SCNC: 25 MMOL/L (ref 21–32)
CREAT SERPL-MCNC: 0.74 MG/DL (ref 0.6–1.3)
CROSSMATCH RESULT,%XM: NORMAL
CROSSMATCH RESULT,%XM: NORMAL
DIFFERENTIAL METHOD BLD: ABNORMAL
EOSINOPHIL # BLD: 0.1 K/UL (ref 0–0.4)
EOSINOPHIL NFR BLD: 1 % (ref 0–5)
ERYTHROCYTE [DISTWIDTH] IN BLOOD BY AUTOMATED COUNT: 20.9 % (ref 11.6–14.5)
GLUCOSE SERPL-MCNC: 89 MG/DL (ref 74–99)
HCT VFR BLD AUTO: 35.1 % (ref 35–45)
HEMOCCULT STL QL: NEGATIVE
HGB BLD-MCNC: 10.2 G/DL (ref 12–16)
LYMPHOCYTES # BLD: 0.7 K/UL (ref 0.9–3.6)
LYMPHOCYTES NFR BLD: 10 % (ref 21–52)
MAGNESIUM SERPL-MCNC: 2.1 MG/DL (ref 1.6–2.6)
MCH RBC QN AUTO: 20.8 PG (ref 24–34)
MCHC RBC AUTO-ENTMCNC: 29.1 G/DL (ref 31–37)
MCV RBC AUTO: 71.5 FL (ref 74–97)
MONOCYTES # BLD: 0.9 K/UL (ref 0.05–1.2)
MONOCYTES NFR BLD: 12 % (ref 3–10)
NEUTS SEG # BLD: 5.4 K/UL (ref 1.8–8)
NEUTS SEG NFR BLD: 76 % (ref 40–73)
PLATELET # BLD AUTO: 227 K/UL (ref 135–420)
PLATELET COMMENTS,PCOM: ABNORMAL
PMV BLD AUTO: 9.5 FL (ref 9.2–11.8)
POTASSIUM SERPL-SCNC: 3.7 MMOL/L (ref 3.5–5.5)
RBC # BLD AUTO: 4.91 M/UL (ref 4.2–5.3)
RBC MORPH BLD: ABNORMAL
SODIUM SERPL-SCNC: 137 MMOL/L (ref 136–145)
SPECIMEN EXP DATE BLD: NORMAL
STATUS OF UNIT,%ST: NORMAL
STATUS OF UNIT,%ST: NORMAL
UNIT DIVISION, %UDIV: 0
UNIT DIVISION, %UDIV: 0
WBC # BLD AUTO: 7.2 K/UL (ref 4.6–13.2)

## 2017-12-03 PROCEDURE — 77030008565 HC TBNG SUC IRR ERBE -B: Performed by: INTERNAL MEDICINE

## 2017-12-03 PROCEDURE — 74011000250 HC RX REV CODE- 250: Performed by: ANESTHESIOLOGY

## 2017-12-03 PROCEDURE — 74011250636 HC RX REV CODE- 250/636: Performed by: INTERNAL MEDICINE

## 2017-12-03 PROCEDURE — 82272 OCCULT BLD FECES 1-3 TESTS: CPT | Performed by: HOSPITALIST

## 2017-12-03 PROCEDURE — 77030019988 HC FCPS ENDOSC DISP BSC -B: Performed by: INTERNAL MEDICINE

## 2017-12-03 PROCEDURE — 0DBN8ZZ EXCISION OF SIGMOID COLON, VIA NATURAL OR ARTIFICIAL OPENING ENDOSCOPIC: ICD-10-PCS | Performed by: INTERNAL MEDICINE

## 2017-12-03 PROCEDURE — 36415 COLL VENOUS BLD VENIPUNCTURE: CPT | Performed by: HOSPITALIST

## 2017-12-03 PROCEDURE — 74011000250 HC RX REV CODE- 250

## 2017-12-03 PROCEDURE — 77030013992 HC SNR POLYP ENDOSC BSC -B: Performed by: INTERNAL MEDICINE

## 2017-12-03 PROCEDURE — 0D5L8ZZ DESTRUCTION OF TRANSVERSE COLON, VIA NATURAL OR ARTIFICIAL OPENING ENDOSCOPIC: ICD-10-PCS | Performed by: INTERNAL MEDICINE

## 2017-12-03 PROCEDURE — 90471 IMMUNIZATION ADMIN: CPT

## 2017-12-03 PROCEDURE — 88342 IMHCHEM/IMCYTCHM 1ST ANTB: CPT | Performed by: INTERNAL MEDICINE

## 2017-12-03 PROCEDURE — 77030038604 HC SNR ENDO EXACTO USEN -B: Performed by: INTERNAL MEDICINE

## 2017-12-03 PROCEDURE — 74011250637 HC RX REV CODE- 250/637: Performed by: INTERNAL MEDICINE

## 2017-12-03 PROCEDURE — 83735 ASSAY OF MAGNESIUM: CPT | Performed by: HOSPITALIST

## 2017-12-03 PROCEDURE — 77030034694 HC SCPL CANADY PLSM DISP USMD -E: Performed by: INTERNAL MEDICINE

## 2017-12-03 PROCEDURE — 74011250636 HC RX REV CODE- 250/636: Performed by: ANESTHESIOLOGY

## 2017-12-03 PROCEDURE — 77030011640 HC PAD GRND REM COVD -A: Performed by: INTERNAL MEDICINE

## 2017-12-03 PROCEDURE — 0DB68ZX EXCISION OF STOMACH, VIA NATURAL OR ARTIFICIAL OPENING ENDOSCOPIC, DIAGNOSTIC: ICD-10-PCS | Performed by: INTERNAL MEDICINE

## 2017-12-03 PROCEDURE — 74011250637 HC RX REV CODE- 250/637: Performed by: HOSPITALIST

## 2017-12-03 PROCEDURE — 88305 TISSUE EXAM BY PATHOLOGIST: CPT | Performed by: INTERNAL MEDICINE

## 2017-12-03 PROCEDURE — 0DBK8ZZ EXCISION OF ASCENDING COLON, VIA NATURAL OR ARTIFICIAL OPENING ENDOSCOPIC: ICD-10-PCS | Performed by: INTERNAL MEDICINE

## 2017-12-03 PROCEDURE — 90686 IIV4 VACC NO PRSV 0.5 ML IM: CPT | Performed by: HOSPITALIST

## 2017-12-03 PROCEDURE — 76040000008: Performed by: INTERNAL MEDICINE

## 2017-12-03 PROCEDURE — 77030018846 HC SOL IRR STRL H20 ICUM -A: Performed by: INTERNAL MEDICINE

## 2017-12-03 PROCEDURE — 74011250636 HC RX REV CODE- 250/636: Performed by: HOSPITALIST

## 2017-12-03 PROCEDURE — 80048 BASIC METABOLIC PNL TOTAL CA: CPT | Performed by: HOSPITALIST

## 2017-12-03 PROCEDURE — 74011250636 HC RX REV CODE- 250/636

## 2017-12-03 PROCEDURE — 76060000033 HC ANESTHESIA 1 TO 1.5 HR: Performed by: INTERNAL MEDICINE

## 2017-12-03 PROCEDURE — 85025 COMPLETE CBC W/AUTO DIFF WBC: CPT | Performed by: HOSPITALIST

## 2017-12-03 PROCEDURE — 77030009426 HC FCPS BIOP ENDOSC BSC -B: Performed by: INTERNAL MEDICINE

## 2017-12-03 RX ORDER — DEXTROSE 50 % IN WATER (D50W) INTRAVENOUS SYRINGE
25-50 AS NEEDED
Status: CANCELLED | OUTPATIENT
Start: 2017-12-03

## 2017-12-03 RX ORDER — PROPOFOL 10 MG/ML
INJECTION, EMULSION INTRAVENOUS AS NEEDED
Status: DISCONTINUED | OUTPATIENT
Start: 2017-12-03 | End: 2017-12-03 | Stop reason: HOSPADM

## 2017-12-03 RX ORDER — INSULIN LISPRO 100 [IU]/ML
INJECTION, SOLUTION INTRAVENOUS; SUBCUTANEOUS ONCE
Status: CANCELLED | OUTPATIENT
Start: 2017-12-03 | End: 2017-12-03

## 2017-12-03 RX ORDER — FUROSEMIDE 10 MG/ML
20 INJECTION INTRAMUSCULAR; INTRAVENOUS ONCE
Status: COMPLETED | OUTPATIENT
Start: 2017-12-03 | End: 2017-12-03

## 2017-12-03 RX ORDER — ASCORBIC ACID 250 MG
250 TABLET ORAL
Qty: 30 TAB | Refills: 2 | Status: SHIPPED | OUTPATIENT
Start: 2017-12-04

## 2017-12-03 RX ORDER — SODIUM CHLORIDE, SODIUM LACTATE, POTASSIUM CHLORIDE, CALCIUM CHLORIDE 600; 310; 30; 20 MG/100ML; MG/100ML; MG/100ML; MG/100ML
75 INJECTION, SOLUTION INTRAVENOUS CONTINUOUS
Status: DISCONTINUED | OUTPATIENT
Start: 2017-12-03 | End: 2017-12-03 | Stop reason: HOSPADM

## 2017-12-03 RX ORDER — SODIUM CHLORIDE 0.9 % (FLUSH) 0.9 %
5-10 SYRINGE (ML) INJECTION AS NEEDED
Status: DISCONTINUED | OUTPATIENT
Start: 2017-12-03 | End: 2017-12-03 | Stop reason: HOSPADM

## 2017-12-03 RX ORDER — SODIUM CHLORIDE 0.9 % (FLUSH) 0.9 %
5-10 SYRINGE (ML) INJECTION EVERY 8 HOURS
Status: DISCONTINUED | OUTPATIENT
Start: 2017-12-03 | End: 2017-12-03 | Stop reason: HOSPADM

## 2017-12-03 RX ORDER — LIDOCAINE HYDROCHLORIDE 10 MG/ML
0.1 INJECTION, SOLUTION EPIDURAL; INFILTRATION; INTRACAUDAL; PERINEURAL AS NEEDED
Status: DISCONTINUED | OUTPATIENT
Start: 2017-12-03 | End: 2017-12-03 | Stop reason: HOSPADM

## 2017-12-03 RX ORDER — NALBUPHINE HYDROCHLORIDE 10 MG/ML
5 INJECTION, SOLUTION INTRAMUSCULAR; INTRAVENOUS; SUBCUTANEOUS
Status: DISCONTINUED | OUTPATIENT
Start: 2017-12-03 | End: 2017-12-03 | Stop reason: HOSPADM

## 2017-12-03 RX ORDER — DIPHENHYDRAMINE HYDROCHLORIDE 50 MG/ML
12.5 INJECTION, SOLUTION INTRAMUSCULAR; INTRAVENOUS
Status: DISCONTINUED | OUTPATIENT
Start: 2017-12-03 | End: 2017-12-03 | Stop reason: HOSPADM

## 2017-12-03 RX ORDER — ONDANSETRON 2 MG/ML
4 INJECTION INTRAMUSCULAR; INTRAVENOUS ONCE
Status: DISCONTINUED | OUTPATIENT
Start: 2017-12-03 | End: 2017-12-03 | Stop reason: HOSPADM

## 2017-12-03 RX ORDER — PROPOFOL 10 MG/ML
INJECTION, EMULSION INTRAVENOUS
Status: DISCONTINUED | OUTPATIENT
Start: 2017-12-03 | End: 2017-12-03 | Stop reason: HOSPADM

## 2017-12-03 RX ORDER — LANOLIN ALCOHOL/MO/W.PET/CERES
325 CREAM (GRAM) TOPICAL
Qty: 30 TAB | Refills: 2 | Status: SHIPPED | OUTPATIENT
Start: 2017-12-04

## 2017-12-03 RX ORDER — LISINOPRIL 5 MG/1
5 TABLET ORAL DAILY
Qty: 30 TAB | Refills: 2 | Status: SHIPPED | OUTPATIENT
Start: 2017-12-04

## 2017-12-03 RX ORDER — MAGNESIUM SULFATE 100 %
4 CRYSTALS MISCELLANEOUS AS NEEDED
Status: CANCELLED | OUTPATIENT
Start: 2017-12-03

## 2017-12-03 RX ORDER — DEXTROMETHORPHAN/PSEUDOEPHED 2.5-7.5/.8
DROPS ORAL AS NEEDED
Status: DISCONTINUED | OUTPATIENT
Start: 2017-12-03 | End: 2017-12-03 | Stop reason: HOSPADM

## 2017-12-03 RX ORDER — DOCUSATE SODIUM 100 MG/1
100 CAPSULE, LIQUID FILLED ORAL
Qty: 30 CAP | Refills: 2 | Status: SHIPPED | OUTPATIENT
Start: 2017-12-03 | End: 2017-12-08

## 2017-12-03 RX ORDER — FENTANYL CITRATE 50 UG/ML
25 INJECTION, SOLUTION INTRAMUSCULAR; INTRAVENOUS AS NEEDED
Status: DISCONTINUED | OUTPATIENT
Start: 2017-12-03 | End: 2017-12-03 | Stop reason: HOSPADM

## 2017-12-03 RX ORDER — GLYCOPYRROLATE 0.2 MG/ML
INJECTION INTRAMUSCULAR; INTRAVENOUS AS NEEDED
Status: DISCONTINUED | OUTPATIENT
Start: 2017-12-03 | End: 2017-12-03 | Stop reason: HOSPADM

## 2017-12-03 RX ADMIN — SODIUM CHLORIDE, SODIUM LACTATE, POTASSIUM CHLORIDE, AND CALCIUM CHLORIDE 75 ML/HR: 600; 310; 30; 20 INJECTION, SOLUTION INTRAVENOUS at 08:04

## 2017-12-03 RX ADMIN — LISINOPRIL 5 MG: 5 TABLET ORAL at 12:44

## 2017-12-03 RX ADMIN — FERROUS SULFATE TAB 325 MG (65 MG ELEMENTAL FE) 325 MG: 325 (65 FE) TAB at 12:44

## 2017-12-03 RX ADMIN — Medication 250 MG: at 12:44

## 2017-12-03 RX ADMIN — FUROSEMIDE 20 MG: 10 INJECTION, SOLUTION INTRAMUSCULAR; INTRAVENOUS at 17:47

## 2017-12-03 RX ADMIN — METOPROLOL TARTRATE 25 MG: 25 TABLET ORAL at 12:44

## 2017-12-03 RX ADMIN — INFLUENZA VIRUS VACCINE 0.5 ML: 15; 15; 15; 15 SUSPENSION INTRAMUSCULAR at 18:14

## 2017-12-03 RX ADMIN — ASPIRIN 81 MG: 81 TABLET, COATED ORAL at 12:44

## 2017-12-03 RX ADMIN — FUROSEMIDE 40 MG: 10 INJECTION, SOLUTION INTRAMUSCULAR; INTRAVENOUS at 12:53

## 2017-12-03 RX ADMIN — FAMOTIDINE 20 MG: 10 INJECTION, SOLUTION INTRAVENOUS at 08:05

## 2017-12-03 RX ADMIN — AMLODIPINE BESYLATE 5 MG: 5 TABLET ORAL at 12:44

## 2017-12-03 RX ADMIN — PROPOFOL 150 MCG/KG/MIN: 10 INJECTION, EMULSION INTRAVENOUS at 08:37

## 2017-12-03 RX ADMIN — Medication 10 ML: at 08:09

## 2017-12-03 RX ADMIN — PROPOFOL 30 MG: 10 INJECTION, EMULSION INTRAVENOUS at 08:37

## 2017-12-03 RX ADMIN — GLYCOPYRROLATE 0.1 MG: 0.2 INJECTION INTRAMUSCULAR; INTRAVENOUS at 08:29

## 2017-12-03 RX ADMIN — ENOXAPARIN SODIUM 60 MG: 60 INJECTION SUBCUTANEOUS at 17:47

## 2017-12-03 NOTE — H&P
History and physical has been reviewed. The patient has been examined. There have been no significant clinical changes since the completion of the originally dated History and Physical.    Ivet Fernandez MD  [facility].  [email or web address]. [email or web address]  Phone: [phone]  Cell: [phone]  [email or web address]

## 2017-12-03 NOTE — ANESTHESIA PREPROCEDURE EVALUATION
Anesthetic History   No history of anesthetic complications            Review of Systems / Medical History  Patient summary reviewed, nursing notes reviewed and pertinent labs reviewed    Pulmonary    COPD               Neuro/Psych   Within defined limits           Cardiovascular    Hypertension        Dysrhythmias            GI/Hepatic/Renal  Within defined limits              Endo/Other  Within defined limits           Other Findings   Comments:   Risk Factors for Postoperative nausea/vomiting:       History of postoperative nausea/vomiting? NO       Female? YES       Motion sickness? NO       Intended opioid administration for postoperative analgesia? NO      Smoking Abstinence  Current Smoker? NO  Elective Surgery? YES  Seen preoperatively by anesthesiologist or proxy prior to day of surgery? YES  Pt abstained from smoking 24 hours prior to anesthesia?  YES           Physical Exam    Airway  Mallampati: I  TM Distance: 4 - 6 cm  Neck ROM: normal range of motion   Mouth opening: Normal     Cardiovascular    Rhythm: regular  Rate: normal         Dental    Dentition: Edentulous     Pulmonary  Breath sounds clear to auscultation               Abdominal  GI exam deferred       Other Findings            Anesthetic Plan    ASA: 3  Anesthesia type: MAC          Induction: Intravenous  Anesthetic plan and risks discussed with: Patient

## 2017-12-03 NOTE — ROUTINE PROCESS
Bedside and Verbal shift change report given to ROGELIO Millan (oncoming nurse) by Dez Wilburn RN (offgoing nurse). Report included the following information SBAR, Kardex, MAR and Recent Results. SITUATION:  Code Status: Full Code  Reason for Admission: Abnormal findings on diagnostic imaging of heart and coronary circulation [R93.1]  Shortness of breath [R06.02]  Other fatigue [R53.83]  Hospital day: 2  Problem List:       Hospital Problems  Date Reviewed: 11/9/2017          Codes Class Noted POA    Anemia ICD-10-CM: D64.9  ICD-9-CM: 285.9  12/1/2017 Unknown        Symptomatic anemia ICD-10-CM: D64.9  ICD-9-CM: 285.9  12/1/2017 Unknown              BACKGROUND:   Past Medical History:   Past Medical History:   Diagnosis Date    AI (aortic insufficiency)     Moderate to severe    Atrial fibrillation (HCC)     on Coumadin    Cardiac echocardiogram 11/08/2016    EF 50%. No WMA. Mild LVH. Gr 2 DDfx. Mild RVE. RVSP 43 mmHg. Severe LAE. Mild ARTI. Prosthetic MV w/normal fx. Mod-severe AI (mean grad 16 mmHg; prev 9 mmHg). Mod TR.  Cardiac Holter monitoring 09/29/2011    Baseline sinus rhythm w/ventricular tracking. Occasional premature ventricular ectopic beats likely correspond w/symptoms.  Complete heart block (HCC)     COPD (chronic obstructive pulmonary disease) ([facility]     HTN (hypertension)     Long term (current) use of anticoagulants 8/19/2011    Mitral valve stenosis and aortic valve insufficiency     status post [facility]. 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     Pacemaker     Medtronic dual-chamber       Patient taking anticoagulants yes    Patient has a defibrillator: no    History of shots NO for example, flu, pneumonia, tetanus   Isolation History NO for example, MRSA, CDiff    ASSESSMENT:  Changes in Assessment Throughout Shift: none  Significant Changes in 24 hours (for example, RR/code, fall)  Patient has Central Line: no   Patient has Arshad Cath: no   Mobility Issues  PT  IV Patency  OR Checklist  Pending Tests    Last Vitals:  Vitals w/ MEWS Score (last day)     Date/Time MEWS Score Pulse Resp Temp BP Level of Consciousness SpO2    12/03/17 0410 1 90 18 97.4 °F (36.3 °C) 127/62 Alert 97 %    12/02/17 2344 1 84 18 98.2 °F (36.8 °C) 137/66 Alert 99 %    12/02/17 2000 1 82 18 98.2 °F (36.8 °C) 119/67 Alert 97 %    12/02/17 1859 -- -- 18 97 °F (36.1 °C) 126/53 Alert 95 %    12/02/17 1839 1 81 16 97 °F (36.1 °C) 127/59 Alert --    12/02/17 1645 2 85 18 97 °F (36.1 °C) 100/60 Alert --    12/02/17 1630 1 80 17 96.7 °F (35.9 °C) 110/51 Alert --    12/02/17 1613 1 89 16 96.7 °F (35.9 °C) 121/69 Alert --    12/02/17 1602 1 71 19 96.7 °F (35.9 °C) 128/55 Alert 99 %    12/02/17 1441 -- -- 20 97.2 °F (36.2 °C) 116/54 Alert --    12/02/17 1348 1 82 18 97.2 °F (36.2 °C) 112/42 Alert 97 %    12/02/17 1315 1 73 16 96.8 °F (36 °C) 119/51 Alert 97 %    12/02/17 1259 1 78 15 97.5 °F (36.4 °C) 108/56 Alert 98 %    12/02/17 1235 1 76 16 97.1 °F (36.2 °C) 120/51 Alert 99 %    12/02/17 1150 1 77 18 97.5 °F (36.4 °C) 110/53 Alert 97 %    12/02/17 0906 1 79 18 97.8 °F (36.6 °C) 124/48 Alert 97 %    12/02/17 0848 -- 72 -- -- 128/57 Alert --    12/02/17 0421 1 82 16 97.8 °F (36.6 °C) 116/62 Alert 99 %    12/02/17 0149 1 88 18 97.1 °F (36.2 °C) 121/69 Alert 97 %            PAIN    Pain Assessment    Pain Intensity 1: 0 (12/03/17 0320)              Patient Stated Pain Goal: 0  Intervention effective: yes  Time of last intervention: Denied pain Reassessment Completed: yes   Other actions taken for pain: Distraction    Last 3 Weights:  Last 3 Recorded Weights in this Encounter    12/01/17 0708 12/03/17 0649   Weight: 54.4 kg (120 lb) 55 kg (121 lb 4.8 oz)   Weight change: 0.59 kg (1 lb 4.8 oz)    INTAKE/OUPUT    Current Shift:      Last three shifts: 12/01 1901 - 12/03 0700  In: 4700 [P.O.:4080]  Out: 7220 [Urine:240]    RECOMMENDATIONS AND DISCHARGE PLANNING  Patient needs and requests: Comfort and Safety    Pending tests/procedures: EGD/Colonoscopy     Discharge plan for patient: Home    Discharge planning Needs or Barriers: none    Estimated Discharge Date: 12/05/2017 Posted on Whiteboard in Patients Room: yes       \"HEALS\" SAFETY CHECK  A safety check occurred in the patient's room between off going nurse and oncoming nurse listed above. The safety check included the below items:    H  High Alert Medications Verify all high alert medication drips (heparin, PCA, etc.)  E  Equipment Suction is set up for ALL patients (with heather)  Red plugs utilized for all equipment (IV pumps, etc.)  WOWs wiped down at end of shift. Room stocked with oxygen, suction, and other unit-specific supplies  A  Alarms Bed alarm is set for fall risk patients  Ensure chair alarm is in place and activated if patient is up in a chair  L  Lines Check IV for any infiltration  Arshad bag is empty if patient has a Arshad   Tubing and IV bags are labeled  S  Safety  Room is clean, patient is clean, and equipment is clean. Hallways are clear from equipment besides carts. Fall bracelet on for fall risk patients  Ensure room is clear and free of clutter  Suction is set up for ALL patients (with heather)  Hallways are clear from equipment besides carts.    Isolation precautions followed, supplies available outside room, sign posted    Sherron Ferrer RN

## 2017-12-03 NOTE — PROGRESS NOTES
Care Management Interventions  PCP Verified by CM: Yes (pt reports that she sees the [facility] on [address]  for any problems)  Mode of Transport at Discharge:  (family)  Transition of Care Consult (CM Consult): [address]: No  Reason Outside [place]: Patient declined ordered home care/hospice services  Physical Therapy Consult: No  Occupational Therapy Consult: No  Current Support Network: Lives with Spouse  Confirm Follow Up Transport: Family  Plan discussed with Pt/Family/Caregiver: Yes  Discharge Location  Discharge Placement: Home with family assistance    Pt is a 78year old admitted for anemia, symptomatic anemia. Pt reports that she lives with her  Mely Nunes [phone] and that prior to admission she was independent in her ADLs and that she has no DME at home. Pt reports that she plans to return home on discharge and that she has transportation home with family. Pt declines H2H. Pt informed that her Doctor would like her to have it and that it is completely free to her. Pt declines and states that she can check her own BP and that she can take care of herself. Pt informed that the nurse would be talking to her about her medical condition and giving her education about it to make sure she understands it and she states that she does not need it.

## 2017-12-03 NOTE — DISCHARGE SUMMARY
Discharge Summary    Patient: Coleen Gonzalez MRN: [id number]  CSN: [id number]    YOB: 1940  Age: 78 y.o.   Sex: female    DOA: 12/1/2017 LOS:  LOS: 2 days   Discharge Date:      Admission Diagnoses: Abnormal findings on diagnostic imaging of heart and coronary circulation [R93.1]  Shortness of breath [R06.02]  Other fatigue [R53.83]    Discharge Diagnoses:    Problem List as of 12/3/2017  Date Reviewed: 11/9/2017          Codes Class Noted - Resolved    Nonrheumatic aortic valve insufficiency ICD-10-CM: I35.1  ICD-9-CM: 424.1  11/9/2017 - Present        Acute combined systolic and diastolic heart failure ([facility] ICD-10-CM: I50.41  ICD-9-CM: 428.41  11/9/2017 - Present        Essential hypertension ICD-10-CM: I10  ICD-9-CM: 401.9  6/1/2017 - Present        Diastolic CHF, chronic ([facility] ICD-10-CM: I50.32  ICD-9-CM: 428.32, 428.0  1/13/2017 - Present        Dyslipidemia ICD-10-CM: E78.5  ICD-9-CM: 272.4  7/8/2016 - Present        Chronic rheumatic heart disease ICD-10-CM: I09.9  ICD-9-CM: 398.90  3/13/2015 - Present        Complete heart block ([facility] ICD-10-CM: I44.2  ICD-9-CM: 426.0  3/13/2015 - Present        AI (aortic insufficiency) ICD-10-CM: I35.1  ICD-9-CM: 424.1  Unknown - Present    Overview Signed 3/13/2015  1:43 PM by Eitan Medellin MD     Moderate to severe             HTN (hypertension) ICD-10-CM: I10  ICD-9-CM: 401.9  Unknown - Present        S/P MVR (mitral valve replacement) ICD-10-CM: Z95.2  ICD-9-CM: V43.3  10/6/2011 - Present        Atrial flutter ([facility] ICD-10-CM: A66.53  ICD-9-CM: 427.32  9/15/2011 - Present        Atrial fibrillation ([facility] [address] ICD-10-CM: I48.91  ICD-9-CM: 427.31  Unknown - Present    Overview Signed 8/19/2011  8:42 AM by Tegan Willett     on Coumadin             Mitral valve stenosis and aortic valve insufficiency ICD-10-CM: I08.0  ICD-9-CM: 396.1  Unknown - Present    Overview Signed 8/19/2011  9:01 AM by Tegan Willett     status post St. 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 Pacemaker ICD-10-CM: Z95.0  ICD-9-CM: V45.01  Unknown - Present    Overview Signed 8/19/2011  9:01 AM by Lance Antunez     Medtronic dual-chamber              COPD (chronic obstructive pulmonary disease) ([facility] [address] ICD-10-CM: J44.9  ICD-9-CM: 80  Unknown - Present        Long term (current) use of anticoagulants ICD-10-CM: Z79.01  ICD-9-CM: V58.61  8/19/2011 - Present        RESOLVED: Anemia ICD-10-CM: D64.9  ICD-9-CM: 285.9  12/1/2017 - 12/3/2017        RESOLVED: Symptomatic anemia ICD-10-CM: D64.9  ICD-9-CM: 285.9  12/1/2017 - 12/3/2017            Reason for Admission  61-year-old white female with a past medical history of chronic rheumatic heart disease, s/p 2 St. Ceferino mechanical mitral valve replacements, most  recently in 2003. She is on chronic anticoagulation with Coumadin. She has nonischemic cardiomyopathy with chronic systolic congestive heart failure, EF of 30% to 35%, as well as moderate to severe aortic insufficiency. She has never had a colonoscopy, nor a mammogram.  She stated that her last Pap smear was in the year 2000. She does not have a primary care physician in the community. On the morning of admission, she underwent cardiac catheterization by Dr. Dimple Daniel, and was noted to have no significant obstructive coronary artery disease. She was noted to have nonischemic cardiomyopathy with an EF of 30% to 35%, elevated right-sided heart pressure, with volume overload and mildly elevated LV filling pressures. Her hemoglobin was 7.6, markedly microcytic and hypochromic. At out interview, she denied  recent fevers, sweats or chills. She noted shortness of breath at rest, as well as dyspnea on exertion. She has chronic 1-pillow orthopnea and frequent episodes of PND. No hematuria. No hematochezia. No dark or tarry stools. No maroon-colored stools. No coffee-grounds in her stool.  any blurred vision or double vision. She stated that she gets some itching after taking Bumex. Dr. Jelani Rivera recommended admission as well as initiation of Lasix 40 mg IV twice daily. Discharge Condition: Good    PHYSICAL EXAM at discharge. Visit Vitals    /74 (BP 1 Location: Left arm, BP Patient Position: At rest)    Pulse 88    Temp 97 °F (36.1 °C)    Resp 18    Ht 5' 4\" (1.626 m)    Wt 55 kg (121 lb 4.8 oz)    SpO2 99%    Breastfeeding No    BMI 20.82 kg/m2     Awake alert and oriented sitting up in bed. , sister and brother in law at beside. [place]. RRR  cta b.l  Soft nt nd nabs  No edema. dp 2+ b.l  No focal deficit  No rash. Hospital Course:   1. Iron deficiency anemia - supplemented po and iv. 2. Chronic rheumatic heart disease, status post St. Ceferino mechanical mitral valve replacement. Resume lovenox --> warfarin at discharge. 3. Acute on chronic systolic CHF with EF 08% - 35%. resolving s/p intravenous Lasix. Discharge on lasix, ACE, beta blocker. 4. Moderate to severe aortic insufficiency. 5. History of atrial fibrillation on Coumadin, and BB. 6. Hx complete heart block s/p dual Medtronic  pacemaker. 7. COPD. No acute exacerbation. 8. Hypertension. Regimen adjusted per cardiology as below   9. Long-term anticoagulation with warfarin. 10. [address] ejection fraction of 30% to 35%. 11. Health maintenance. never had colonoscopy nor  mammogram. last Pap smear was ~ year 2000. 12. Anemia requiring transfusion - 2 units 12/2/17  13. dvt prophylaxis was given in the form of treatment dose Lovenox. 14. FULL CODE. Discharge to home with hh for chf. Patient and family agree; all questions answered to the best of my ability. Consults: Cardiology Dr. Jerry Caldwell    Procedures  1. Right heart catheterization with thermodilution technique. Left heart catheterization, bilateral selective coronary angiography. Moderate sedation.  12/01/2017  Lorenzo Zavala MD  2. Jennie Ackerman / colonoscopy Dr. Yani Bowie 12/3/17    Significant Diagnostic Studies: labs:   Recent Results (from the past 24 hour(s))   OCCULT BLOOD, STOOL    Collection Time: 12/03/17 12:30 AM   Result Value Ref Range    Occult blood, stool NEGATIVE  NEG     CBC WITH AUTOMATED DIFF    Collection Time: 12/03/17  2:58 AM   Result Value Ref Range    WBC 7.2 4.6 - 13.2 K/uL    RBC 4.91 4.20 - 5.30 M/uL    HGB 10.2 (L) 12.0 - 16.0 g/dL    HCT 35.1 35.0 - 45.0 %    MCV 71.5 (L) 74.0 - 97.0 FL    MCH 20.8 (L) 24.0 - 34.0 PG    MCHC 29.1 (L) 31.0 - 37.0 g/dL    RDW 20.9 (H) 11.6 - 14.5 %    PLATELET 957 195 - 860 K/uL    MPV 9.5 9.2 - 11.8 FL    NEUTROPHILS 76 (H) 40 - 73 %    LYMPHOCYTES 10 (L) 21 - 52 %    MONOCYTES 12 (H) 3 - 10 %    EOSINOPHILS 1 0 - 5 %    BASOPHILS 1 0 - 2 %    ABS. NEUTROPHILS 5.4 1.8 - 8.0 K/UL    ABS. LYMPHOCYTES 0.7 (L) 0.9 - 3.6 K/UL    ABS. MONOCYTES 0.9 0.05 - 1.2 K/UL    ABS. EOSINOPHILS 0.1 0.0 - 0.4 K/UL    ABS.  BASOPHILS 0.1 0.0 - 0.1 K/UL    DF AUTOMATED      PLATELET COMMENTS ADEQUATE PLATELETS      RBC COMMENTS ANISOCYTOSIS  2+        RBC COMMENTS MICROCYTOSIS  2+        RBC COMMENTS HYPOCHROMIA  3+        RBC COMMENTS POLYCHROMASIA  1+       MAGNESIUM    Collection Time: 12/03/17  2:58 AM   Result Value Ref Range    Magnesium 2.1 1.6 - 2.6 mg/dL   METABOLIC PANEL, BASIC    Collection Time: 12/03/17  2:58 AM   Result Value Ref Range    Sodium 137 136 - 145 mmol/L    Potassium 3.7 3.5 - 5.5 mmol/L    Chloride 103 100 - 108 mmol/L    CO2 25 21 - 32 mmol/L    Anion gap 9 3.0 - 18 mmol/L    Glucose 89 74 - 99 mg/dL    BUN 14 7.0 - 18 MG/DL    Creatinine 0.74 0.6 - 1.3 MG/DL    BUN/Creatinine ratio 19 12 - 20      GFR est AA >60 >60 ml/min/1.73m2    GFR est non-AA >60 >60 ml/min/1.73m2    Calcium 8.4 (L) 8.5 - 10.1 MG/DL     All Micro Results     None        IMAGING  NONE    Discharge Medications:     Current Discharge Medication List      START taking these medications    Details   ascorbic acid, vitamin C, (VITAMIN C) 250 mg tablet Take 1 Tab by mouth daily (with breakfast). Qty: 30 Tab, Refills: 2      docusate sodium (COLACE) 100 mg capsule Take 1 Cap by mouth daily (with breakfast) for 90 days. HOLD for loose stool. Qty: 30 Cap, Refills: 2      ferrous sulfate 325 mg (65 mg iron) tablet Take 1 Tab by mouth daily (with breakfast). Qty: 30 Tab, Refills: 2         CONTINUE these medications which have CHANGED    Details   lisinopril (PRINIVIL, ZESTRIL) 5 mg tablet Take 1 Tab by mouth daily. Qty: 30 Tab, Refills: 2         CONTINUE these medications which have NOT CHANGED    Details   enoxaparin (LOVENOX) 60 mg/0.6 mL injection 60 mg by SubCUTAneous route every twelve (12) hours. Qty: 10 Syringe, Refills: 0      metoprolol succinate (TOPROL XL) 100 mg tablet Take 1 Tab by mouth daily. Qty: 90 Tab, Refills: 3      amLODIPine (NORVASC) 5 mg tablet Take 1 Tab by mouth daily. Qty: 90 Tab, Refills: 3      bumetanide (BUMEX) 2 mg tablet Take 1 Tab by mouth two (2) times a day. Qty: 180 Tab, Refills: 3      aspirin delayed-release 81 mg tablet Take 1 Tab by mouth daily. Qty: 90 Tab, Refills: 3      vitamin E (AQUA GEMS) 400 unit capsule Take 400 Units by mouth daily. warfarin (COUMADIN) 6 mg tablet Take 1 Tab by mouth daily. or as directed  Qty: 135 Tab, Refills: 3      potassium chloride SR (K-TAB) 20 mEq tablet Take 2 Tabs by mouth two (2) times a day. Qty: 360 Tab, Refills: 3             Activity: Activity as tolerated    Diet: Cardiac Diet    Wound Care: As directed    Follow-up:   1. Dr. Emmy Deras new patient. Establish pcp, hospital f/u  2. Dr. Elisabeth Del Valle 6 weeks  3. Dr. Rima Farooq 4 weeks.     Minutes spent on discharge: greater than 30

## 2017-12-03 NOTE — DISCHARGE INSTRUCTIONS
DISCHARGE SUMMARY from Nurse    PATIENT INSTRUCTIONS:    After general anesthesia or intravenous sedation, for 24 hours or while taking prescription Narcotics:  · Limit your activities  · Do not drive and operate hazardous machinery  · Do not make important personal or business decisions  · Do  not drink alcoholic beverages  · If you have not urinated within 8 hours after discharge, please contact your surgeon on call. Report the following to your surgeon:  · Excessive pain, swelling, redness or odor of or around the surgical area  · Temperature over 100.5  · Nausea and vomiting lasting longer than 4 hours or if unable to take medications  · Any signs of decreased circulation or nerve impairment to extremity: change in color, persistent  numbness, tingling, coldness or increase pain  · Any questions    What to do at Home:  Recommended activity: Activity as tolerated. If you experience any of the following symptoms fever 101 or greater, nausea, vomiting, diarrhea, shortness of breath, blood in urine or stool, any problems or concerns. Follow up with Gastroenterology (Dr. Justine Prince). Follow-up with Cardiology Dr. Antonella Dupont    *  Please give a list of your current medications to your Primary Care Provider. *  Please update this list whenever your medications are discontinued, doses are      changed, or new medications (including over-the-counter products) are added. *  Please carry medication information at all times in case of emergency situations. These are general instructions for a healthy lifestyle:    No smoking/ No tobacco products/ Avoid exposure to second hand smoke  Surgeon General's Warning:  Quitting smoking now greatly reduces serious risk to your health.     Obesity, smoking, and sedentary lifestyle greatly increases your risk for illness    A healthy diet, regular physical exercise & weight monitoring are important for maintaining a healthy lifestyle    You may be retaining fluid if you have a history of heart failure or if you experience any of the following symptoms:  Weight gain of 3 pounds or more overnight or 5 pounds in a week, increased swelling in our hands or feet or shortness of breath while lying flat in bed. Please call your doctor as soon as you notice any of these symptoms; do not wait until your next office visit. Recognize signs and symptoms of STROKE:    F-face looks uneven    A-arms unable to move or move unevenly    S-speech slurred or non-existent    T-time-call 911 as soon as signs and symptoms begin-DO NOT go       Back to bed or wait to see if you get better-TIME IS BRAIN. Warning Signs of HEART ATTACK     Call 911 if you have these symptoms:   Chest discomfort. Most heart attacks involve discomfort in the center of the chest that lasts more than a few minutes, or that goes away and comes back. It can feel like uncomfortable pressure, squeezing, fullness, or pain.  Discomfort in other areas of the upper body. Symptoms can include pain or discomfort in one or both arms, the back, neck, jaw, or stomach.  Shortness of breath with or without chest discomfort.  Other signs may include breaking out in a cold sweat, nausea, or lightheadedness. Don't wait more than five minutes to call 911 - MINUTES MATTER! Fast action can save your life. Calling 911 is almost always the fastest way to get lifesaving treatment. Emergency Medical Services staff can begin treatment when they arrive -- up to an hour sooner than if someone gets to the hospital by car. Patient armband removed and shredded  MyChart Activation    Thank you for requesting access to [employer]. Please follow the instructions below to securely access and download your online medical record. [employer] allows you to send messages to your doctor, view your test results, renew your prescriptions, schedule appointments, and more. How Do I Sign Up? 1. In your internet browser, go to [email or web address]  2.  Click on the First Time User? Click Here link in the Sign In box. You will be redirect to the New Member Sign Up page. 3. Enter your [employer] Access Code exactly as it appears below. You will not need to use this code after youve completed the sign-up process. If you do not sign up before the expiration date, you must request a new code. MyChart Access Code: Activation code not generated  Current [employer] Status: Active (This is the date your [email or web address] access code will )    4. Enter the last four digits of your Social Security Number (xxxx) and Date of Birth (mm/dd/yyyy) as indicated and click Submit. You will be taken to the next sign-up page. 5. Create a [email or web address] ID. This will be your [employer] login ID and cannot be changed, so think of one that is secure and easy to remember. 6. Create a [employer] password. You can change your password at any time. 7. Enter your Password Reset Question and Answer. This can be used at a later time if you forget your password. 8. Enter your e-mail address. You will receive e-mail notification when new information is available in [address] 9. Click Sign Up. You can now view and download portions of your medical record. 10. Click the Download Summary menu link to download a portable copy of your medical information. Additional Information    If you have questions, please visit the Frequently Asked Questions section of the [employer] website at [email or web address] Remember, [employer] is NOT to be used for urgent needs. For medical emergencies, dial 911. The discharge information has been reviewed with the patient. The patient verbalized understanding. Discharge medications reviewed with the patient and appropriate educational materials and side effects teaching were provided.   ___________________________________________________________________________________________________________________________________

## 2017-12-03 NOTE — PROGRESS NOTES
Problem: Falls - Risk of  Goal: *Absence of Falls  Document Jc Fall Risk and appropriate interventions in the flowsheet.    Outcome: Progressing Towards Goal  Fall Risk Interventions:            Medication Interventions: Patient to call before getting OOB, Teach patient to arise slowly    Elimination Interventions: Call light in reach, Patient to call for help with toileting needs, Toilet paper/wipes in reach    History of Falls Interventions: Door open when patient unattended, Room close to nurse's station

## 2017-12-03 NOTE — PROGRESS NOTES
[facility]  -  Progress Note      Patient: Jessica Moreira MRN: [id number]  SSN: [id number]    YOB: 1940  Age: 78 y.o. Sex: female      Admit Date: 12/1/2017    Assessment:     -  Cardiomyopathy: Worsening with last EF 35%  - Cardiac cath 12/1/17: NO obstructive CAD, Elevated filling pressure   AI (aortic insufficiency): Moderate to severe   Atrial fibrillation (HCC)  on chronic coumadin, currently lovenox  -  Anemia: GI work up pending.  seeing patient  -  S/P PPM for  Complete heart block (HCC)    COPD (chronic obstructive pulmonary disease) (HCC)    HTN (hypertension)    Mitral valve stenosis and aortic valve insufficiency    status post 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  Pacemaker    Medtronic dual-chamber     Plan:     - GI work up noted. No obvious bleed. OK to start anticoagulation by GI team. Billy Younger who stated ok to start anticoagulation lovenox and bridge with coumadin.  -minimal rales on exam, not decompensated  - patient has lovenox at home and very good understanding of how to take it at home. She is to take coumadiin 9 mg tonight and tomorrow night per office instruction along with lovenox BID and keep checking INR and stop lovenox when INR 2.5 or above. She is very educated and insist that she wants to go home  She has 4 doses of lovenox and she will keep checking INR at home with h ome machine and she will call our office on Tuesday with INR result to decide further plan about coumadin and lovenox. - can resume home dose of bumex once dc  - [facility] Primary team    Subjective:     No new complaints.     Dyspnea, improved  NO CP    Objective:      Patient Vitals for the past 8 hrs:   Temp Pulse Resp BP SpO2   12/03/17 1012 97.7 °F (36.5 °C) 73 16 - 96 %   12/03/17 0944 - 75 14 110/48 -   12/03/17 0937 - 74 13 - 99 %   12/03/17 0933 - - - (!) 110/35 100 %   12/03/17 0932 - 73 15 109/48 100 %   12/03/17 0930 - 74 20 - 100 %   12/03/17 0929 - 75 19 - 100 %   12/03/17 0926 - 77 14 (!) 105/38 100 %   12/03/17 0923 - 79 16 98/41 99 %   12/03/17 0922 - 70 15 - 100 %   12/03/17 0921 - 70 16 - 100 %   12/03/17 0919 - 72 15 - 100 %   12/03/17 0918 - 72 15 - 100 %   12/03/17 0916 - 72 18 - 100 %   12/03/17 0913 97.5 °F (36.4 °C) 73 15 (!) 100/34 100 %   12/03/17 0910 98.1 °F (36.7 °C) 69 15 105/63 99 %   12/03/17 0751 97.7 °F (36.5 °C) 80 18 145/67 97 %   12/03/17 0410 97.4 °F (36.3 °C) 90 18 127/62 97 %         Patient Vitals for the past 96 hrs:   Weight   12/03/17 0649 121 lb 4.8 oz (55 kg)   12/01/17 0708 120 lb (54.4 kg)         Intake/Output Summary (Last 24 hours) at 12/03/17 1125  Last data filed at 12/03/17 9573   Gross per 24 hour   Intake             4700 ml   Output             1490 ml   Net             3210 ml       Physical Exam:  General:  alert, cooperative, no distress, appears stated age  Neck:  no JVD  Lungs:  Few bibasilar rales  Heart:  regular rate and rhythm, MV click  Abdomen:  abdomen is soft without significant tenderness, masses, organomegaly or guarding  Extremities:  Trace edema  Data Review:     Labs: Results:       Chemistry Recent Labs      12/03/17   0258  12/02/17   0224  12/01/17   1517   GLU  89  97  112*   NA  137  139  137   K  3.7  3.6  4.4   CL  103  104  104   CO2  25  26  28   BUN  14  26*  24*   CREA  0.74  0.89  1.01   CA  8.4*  8.0*  8.5   MG  2.1  2.3  2.3   PHOS   --   3.6  3.3   AGAP  9  9  5   BUCR  19  29*  24*      CBC w/Diff Recent Labs      12/03/17   0258  12/02/17   0224  12/01/17   1517  12/01/17   0745   WBC  7.2  5.3   --   7.4   RBC  4.91  3.85*   --   3.96*   HGB  10.2*  7.1*  7.6*  7.6*   HCT  35.1  26.1*  27.6*  27.0*   PLT  227  276   --   285   GRANS  76*  65   --    --    LYMPH  10*  17*   --    --    EOS  1  1   --    --       Cardiac Enzymes No results found for: CPK, CK, CKMMB, CKMB, RCK3, CKMBT, CKNDX, CKND1, ДМИТРИЙ, TROPT, TROIQ, ULISES, TROPT, TNIPOC, BNP, BNPP   Coagulation Recent Labs      12/01/17   0730   PTP  16.7*   INR  1.4*       Lipid Panel Lab Results   Component Value Date/Time    Cholesterol, total 231 03/24/2015 12:00 AM    HDL Cholesterol 50 03/24/2015 12:00 AM    LDL, calculated 160 03/24/2015 12:00 AM    VLDL, calculated 21 03/24/2015 12:00 AM    Triglyceride 106 03/24/2015 12:00 AM      BNP No results found for: BNP, BNPP, XBNPT   Liver Enzymes No results for input(s): TP, ALB, TBIL, AP, SGOT, GPT in the last 72 hours.     No lab exists for component: DBIL   Digoxin    Thyroid Studies No results found for: T4, T3U, TSH, TSHEXT, TSHEXT

## 2017-12-03 NOTE — ANESTHESIA POSTPROCEDURE EVALUATION
Post-Anesthesia Evaluation and Assessment    Patient: Daniela Clark MRN: [id number]  SSN: [id number]    YOB: 1940  Age: 78 y.o. Sex: female       Cardiovascular Function/Vital Signs  Visit Vitals    BP 98/41    Pulse 79    Temp 36.4 °C (97.5 °F)    Resp 16    Ht 5' 4\" (1.626 m)    Wt 55 kg (121 lb 4.8 oz)    SpO2 99%    Breastfeeding No    BMI 20.82 kg/m2       Patient is status post MAC anesthesia for Procedure(s):  ENDOSCOPY w biospy  COLONOSCOPY w APC w polypectomy. Nausea/Vomiting: None    Postoperative hydration reviewed and adequate. Pain:  Pain Scale 1: Numeric (0 - 10) (12/03/17 0913)  Pain Intensity 1: 0 (12/03/17 0913)   Managed    Neurological Status:   Neuro  Neurologic State: Alert;Eyes open spontaneously (12/03/17 0751)  Orientation Level: Oriented X4 (12/03/17 0751)  Cognition: Appropriate decision making (12/03/17 0751)  Speech: Clear (12/03/17 0751)  Assessment L Pupil: Brisk;Round (12/02/17 1919)  Assessment R Pupil: Brisk;Round (12/02/17 1919)  LUE Motor Response: Purposeful (12/03/17 0751)  LLE Motor Response: Purposeful (12/03/17 0751)  RUE Motor Response: Purposeful (12/03/17 0751)  RLE Motor Response: Purposeful (12/03/17 0751)   At baseline    Mental Status and Level of Consciousness: Arousable    Pulmonary Status:   O2 Device: Nasal cannula (12/03/17 0913)   Adequate oxygenation and airway patent    Complications related to anesthesia: None    Post-anesthesia assessment completed.  No concerns    Signed By: Jasiel Bishop MD     December 3, 2017

## 2017-12-03 NOTE — PROGRESS NOTES
Received on rounds in bed. A/OX4. Denies pain. Appears to be in no resp. dstress. NPO for procedure. Fall precautions. Call bell phone within reach. Family at bedside.

## 2017-12-03 NOTE — PROGRESS NOTES
[email or web address]  [address] [place], [address]      Brief Procedure Note    Dawn Justyn  1940  [id number]    Date of Procedure: 12/3/2017    Preoperative diagnosis: Bleeding    Postoperative diagnosis:  EGD: gastritis, hiatal hernia, 42,40 hiatal hernia, erosion  GE junction  Colon: Rectal prolapse,  Hepatic AVM, ascending polyp, sigmiod polyp ( un retrived), hemorriods    Type of Anesthesia: MAC (Monitored anesthesia care)    Description of findings: same as post op dx    Procedure: Procedure(s):  ENDOSCOPY w biospy  COLONOSCOPY w APC w polypectomy    :  Dr. Jarrtet Doe MD    Assistant(s): Endoscopy Technician-1: Brianna Buitrago  Endoscopy RN-1: Nora Miller RN    EBL:None    Specimens:   ID Type Source Tests Collected by Time Destination   1 : gastric bx Preservative Gastric  Jarrett Doe MD 12/3/2017 6268 Pathology   2 : Ascending colon polyp Preservative Colon  Jarrett Doe MD 12/3/2017 0901 Pathology       Findings: See printed and scanned procedure note    Complications: None    Dr. Jarrett Doe MD  12/3/2017  9:56 AM

## 2017-12-03 NOTE — PERIOP NOTES
TRANSFER - OUT REPORT:    Verbal report given to [facility] LPN on Shana Lin  being transferred to  for routine post - op       Report consisted of patients Situation, Background, Assessment and   Recommendations(SBAR). Information from the following report(s) SBAR and MAR was reviewed with the receiving nurse. Lines:   Peripheral IV 12/01/17 Left Antecubital (Active)   Site Assessment Clean, dry, & intact 12/3/2017  9:13 AM   Phlebitis Assessment 0 12/3/2017  9:13 AM   Infiltration Assessment 0 12/3/2017  9:13 AM   Dressing Status Clean, dry, & intact 12/3/2017  9:13 AM   Dressing Type Tape;Transparent 12/3/2017  9:13 AM   Hub Color/Line Status Pink;Capped 12/3/2017  9:13 AM   Action Taken Open ports on tubing capped 12/1/2017  6:00 PM   Alcohol Cap Used Yes 12/1/2017  6:00 PM       Peripheral IV 12/01/17 Right Antecubital (Active)   Site Assessment Clean, dry, & intact 12/3/2017  9:13 AM   Phlebitis Assessment 0 12/3/2017  9:13 AM   Infiltration Assessment 0 12/3/2017  9:13 AM   Dressing Status Clean, dry, & intact 12/3/2017  9:13 AM   Dressing Type Tape;Transparent 12/3/2017  9:13 AM   Hub Color/Line Status Pink; Infusing 12/3/2017  9:13 AM   Action Taken Open ports on tubing capped 12/1/2017  6:00 PM   Alcohol Cap Used Yes 12/1/2017  6:00 PM        Opportunity for questions and clarification was provided.       Patient transported with:   [place]

## 2017-12-03 NOTE — PROCEDURES
[facility]  [facility], [address]     Endoscopic Gastroduodenoscopy  and Colonscopy Procedure Note    Mel Vaughn  1940  [id number]    Indication:  Iron deficiency anemia     : Jerry Moore MD    Referring Provider:  None      Anesthesia/Sedation:  MAC anesthesia      EGD Procedure Details     After infomed consent was obtained for the procedure, with all risks and benefits of procedure explained the patient was taken to the endoscopy suite and placed in the left lateral decubitus position. Following sequential administration of sedation as per above, the endoscope was inserted into the mouth and advanced under direct vision to second portion of the duodenum. A careful inspection was made as the gastroscope was withdrawn, including a retroflexed view of the proximal stomach; findings and interventions are described below. Findings:   Esophagus:normal  Stomach: Small HH-40 to 42 cm, Mild antral gastritis-bx taken, small erosion at GE- junction, on gastric side  Duodenum/jejunum: Normal    Therapies:  biopsy of stomach body, antrum    Specimens:   ID Type Source Tests Collected by Time Destination   1 : gastric bx Preservative Gastric  Jerry Moore MD 12/3/2017 4316 Pathology   2 : Ascending colon polyp Preservative Colon  Jerry Moore MD 12/3/2017 0901 Pathology   3 : sigmoid polyp Preservative Colon  Jerry Moore MD 12/3/2017 0907 Pathology                     Impression:     EGD: gastritis, hiatal hernia, 42,40 hiatal hernia, erosion  GE junction  Colon: Rectal prolapse,  Hepatic AVM, ascending polyp, sigmiod polyp, hemorriods        Colonscopy Procedure Details:  After informed consent was obtained with all risks and benefits of procedure explained and preoperative exam completed, the patient was taken to the endoscopy suite and placed in the left lateral decubitus position.   Upon sequential sedation as per above, a digital rectal exam was performed and was normal.  The Olympus videocolonoscope was inserted in the rectum and carefully advanced to the terminal ileum. The quality of preparation was satisfactory. The colonoscope was slowly withdrawn with careful evaluation between folds. Retroflexion in the rectum was performed and was normal.    Findings:   Rectum: Internal hemorrhoids, mild rectal prolapse  Sigmoid: 3 mm sessile polyp, removed by snare, could not be retreived  Descending Colon: normal  Transverse Colon: Normal  Hepatic flexure: 6 mm AVM seen-cauterized with APC, R colon setting-30 W, 1 L flow  Ascending Colon: 11 mm sessile polyp-removed by cold snare. Cecum: Normal  Terminal Ileum: normal    Interventions:  As above    Specimen Removed:  As above    Complications: None. EBL:  None. Recommendations:   1. Resume diet  2. Follow path  3. Watch for re-bleeding  4. Ok to resume anticoagulation  5. May consider pill camera as outpatient (if ok with cardiologist given her pacemaker)  6. Follow up in the office in 4 weeks.       Jerry Moore MD  12/3/2017  9:22 AM

## 2017-12-03 NOTE — PROGRESS NOTES
Hospitalist Progress Note    Patient: Julisa Arnett MRN: [id number]  CSN: [id number]    YOB: 1940  Age: 78 y.o. Sex: female    DOA: 12/1/2017 LOS:  LOS: 2 days          Transfused 2 units prbc with appropriate increase in hct. S/p egd which revealed gastritis, hiatal hernia, erosion at GE junction. Gastric bx done. S/p colo which revealed rectal prolapse, hepatic AVM, ascending polyp, sigmiod polyp ( unretrieved), hemorriods. Ascending colon polyp removed. Assessment/Plan     1. Iron deficiency anemia - supplement po and iv. 2. Chronic rheumatic heart disease, status post [facility]. Ceferino mechanical mitral valve replacement. continue Lovenox and continue to hold warfarin until cleared by Cardiology and Gastroenterology. 3. Acute on chronic systolic CHF with EF 75% - 35%. on intravenous Lasix, strict I's and O's,  daily weights. Cardiology input appreciated. 4. Moderate to severe aortic insufficiency. 5. History of atrial fibrillation on Coumadin, and BB. 6. Hx complete heart block s/p dual Medtronic  pacemaker. 7. COPD. No acute exacerbation. 8. Hypertension. On home meds w/ holding parameters. Metoprolol changed to lower dose, bid dosing. 9. Long-term anticoagulation with warfarin. 10. [address] ejection fraction of 30% to 35%. 11. Health maintenance. never had colonoscopy nor  mammogram. last Pap smear was ~ year 2000. 12. Anemia requiring transfusion - 2 units 12/2/17  13. dvt prophylaxis with treatment dose Lovenox. 14. FULL CODE. Tele.     Additional Notes:      Case discussed with:  [x]Patient  []Family  [x]Nursing  []Case Management  DVT Prophylaxis:  [x]Lovenox  []Hep SQ  []SCDs  []Coumadin   []On Heparin gtt    Vital signs/Intake and Output:  Visit Vitals    /48    Pulse 73    Temp 97.7 °F (36.5 °C)    Resp 16    Ht 5' 4\" (1.626 m)    Wt 55 kg (121 lb 4.8 oz)    SpO2 96%    Breastfeeding No    BMI 20.82 kg/m2     Current Shift:     Last three shifts:  12/01 1901 - 12/03 0700  In: 4700 [P.O.:4080]  Out: 1490 [Urine:240]    Awake alert and oriented sitting up in bed getting BT. [place]. Perrl. RRR  cta b.l  Soft nt nd nabs  No edema. dp 2+ b.l  No focal deficit  No rash. Medications Reviewed      Labs: Results:       Chemistry Recent Labs      12/03/17 0258 12/02/17 0224 12/01/17   1517   GLU  89  97  112*   NA  137  139  137   K  3.7  3.6  4.4   CL  103  104  104   CO2  25  26  28   BUN  14  26*  24*   CREA  0.74  0.89  1.01   CA  8.4*  8.0*  8.5   AGAP  9  9  5   BUCR  19  29*  24*      CBC w/Diff Recent Labs      12/03/17 0258 12/02/17 0224 12/01/17   1517  12/01/17   0745   WBC  7.2  5.3   --   7.4   RBC  4.91  3.85*   --   3.96*   HGB  10.2*  7.1*  7.6*  7.6*   HCT  35.1  26.1*  27.6*  27.0*   PLT  227  276   --   285   GRANS  76*  65   --    --    LYMPH  10*  17*   --    --    EOS  1  1   --    --       Cardiac Enzymes No results for input(s): CPK, CKND1, ДМИТРИЙ in the last 72 hours. No lab exists for component: CKRMB, TROIP   Coagulation Recent Labs      12/01/17   0730   PTP  16.7*   INR  1.4*       Lipid Panel Lab Results   Component Value Date/Time    Cholesterol, total 231 03/24/2015 12:00 AM    HDL Cholesterol 50 03/24/2015 12:00 AM    LDL, calculated 160 03/24/2015 12:00 AM    VLDL, calculated 21 03/24/2015 12:00 AM    Triglyceride 106 03/24/2015 12:00 AM      BNP No results for input(s): BNPP in the last 72 hours. Liver Enzymes No results for input(s): TP, ALB, TBIL, AP, SGOT, GPT in the last 72 hours. No lab exists for component: DBIL   Thyroid Studies No results found for: T4, T3U, TSH, TSHEXT, TSHEXT     Procedures/imaging: see electronic medical records for all procedures/Xrays and details which were not copied into this note but were reviewed prior to creation of Plan.

## 2017-12-03 NOTE — PROGRESS NOTES
D/c instructions along with prescriptions given to patient with verbal understanding of instructions. D/cd off floor in a wheelchair.

## 2017-12-08 ENCOUNTER — ANTI-COAG VISIT (OUTPATIENT)
Dept: CARDIOLOGY CLINIC | Age: 77
End: 2017-12-08

## 2017-12-08 ENCOUNTER — OFFICE VISIT (OUTPATIENT)
Dept: CARDIOLOGY CLINIC | Age: 77
End: 2017-12-08

## 2017-12-08 VITALS
HEART RATE: 73 BPM | OXYGEN SATURATION: 97 % | DIASTOLIC BLOOD PRESSURE: 60 MMHG | HEIGHT: 64 IN | SYSTOLIC BLOOD PRESSURE: 120 MMHG

## 2017-12-08 DIAGNOSIS — I08.0 MITRAL VALVE STENOSIS AND AORTIC VALVE INSUFFICIENCY: Primary | ICD-10-CM

## 2017-12-08 DIAGNOSIS — I08.0 MITRAL VALVE STENOSIS AND AORTIC VALVE INSUFFICIENCY: ICD-10-CM

## 2017-12-08 DIAGNOSIS — I50.32 DIASTOLIC CHF, CHRONIC (HCC): ICD-10-CM

## 2017-12-08 DIAGNOSIS — I50.41 ACUTE COMBINED SYSTOLIC AND DIASTOLIC HEART FAILURE (HCC): Primary | ICD-10-CM

## 2017-12-08 DIAGNOSIS — I10 ESSENTIAL HYPERTENSION: ICD-10-CM

## 2017-12-08 DIAGNOSIS — I48.0 PAROXYSMAL ATRIAL FIBRILLATION (HCC): ICD-10-CM

## 2017-12-08 DIAGNOSIS — Z79.01 LONG TERM CURRENT USE OF ANTICOAGULANT THERAPY: ICD-10-CM

## 2017-12-08 DIAGNOSIS — Z95.2 S/P MVR (MITRAL VALVE REPLACEMENT): ICD-10-CM

## 2017-12-08 DIAGNOSIS — D50.9 IRON DEFICIENCY ANEMIA, UNSPECIFIED IRON DEFICIENCY ANEMIA TYPE: ICD-10-CM

## 2017-12-08 DIAGNOSIS — Z95.0 PACEMAKER: ICD-10-CM

## 2017-12-08 DIAGNOSIS — E78.5 DYSLIPIDEMIA: ICD-10-CM

## 2017-12-08 DIAGNOSIS — I44.2 COMPLETE HEART BLOCK (HCC): ICD-10-CM

## 2017-12-08 DIAGNOSIS — I35.1 NONRHEUMATIC AORTIC VALVE INSUFFICIENCY: ICD-10-CM

## 2017-12-08 DIAGNOSIS — I48.3 TYPICAL ATRIAL FLUTTER (HCC): ICD-10-CM

## 2017-12-08 LAB
INR BLD: 1.5
PT POC: 18 SECONDS
VALID INTERNAL CONTROL?: YES

## 2017-12-08 RX ORDER — POTASSIUM CHLORIDE 1500 MG/1
20 TABLET, FILM COATED, EXTENDED RELEASE ORAL 2 TIMES DAILY
Qty: 90 TAB | Refills: 3 | Status: SHIPPED | OUTPATIENT
Start: 2017-12-08

## 2017-12-08 RX ORDER — FUROSEMIDE 40 MG/1
80 TABLET ORAL DAILY
Qty: 60 TAB | Refills: 11 | Status: SHIPPED | OUTPATIENT
Start: 2017-12-08

## 2017-12-08 RX ORDER — ENOXAPARIN SODIUM 100 MG/ML
60 INJECTION SUBCUTANEOUS EVERY 12 HOURS
Qty: 10 SYRINGE | Refills: 0 | Status: SHIPPED | OUTPATIENT
Start: 2017-12-08

## 2017-12-08 NOTE — PROGRESS NOTES
1. Have you been to the ER, urgent care clinic since your last visit? Hospitalized since your last visit? Yes, 12/1/17 for cath    2. Have you seen or consulted any other health care providers outside of the [facility] since your last visit? Include any pap smears or colon screening.   No

## 2017-12-08 NOTE — PROGRESS NOTES
HISTORY OF PRESENT ILLNESS  Negar Joshi is a 78 y.o. female. Hospital Follow Up   Pertinent negatives include no chest pain, no abdominal pain, no headaches and no shortness of breath. Shortness of Breath   Associated symptoms include leg swelling. Pertinent negatives include no fever, no headaches, no cough, no wheezing, no PND, no orthopnea, no chest pain, no vomiting, no abdominal pain, no rash and no claudication. Leg Swelling   Pertinent negatives include no chest pain, no abdominal pain, no headaches and no shortness of breath. Patient presents for a post hospital visit. Patient has a past medical history significant for chronic rheumatic heart disease, status post 2 prior [employer] mechanical prosthetic mitral valve replacements, most recently in 2003. During her last surgery, and non-coronary cusp of her aortic valve was inadvertently sutured open resulting in moderate to severe aortic insufficiency which has been present for the past 10 years or more. She also has a history of complete heart block following her first valve replacement, resulting in a dual-chamber permanent pacemaker. Through the years the patient has had paroxysmal atrial fibrillation, and remains on warfarin for anticoagulation for both her mechanical valve and the arrhythmia. Patient recently underwent a follow-up echocardiogram in November 2017 which showed a decrease in her left ventricular ejection fraction down to 30-35%, where it had previously been 50% with akinesis of the inferior apex. She continued to have moderate to severe aortic insufficiency with a normal functioning mechanical mitral valve prosthesis. Patient  underwent a right and left heart catheterization earlier this month in December 2017 to evaluate her depressed ejection fraction and symptoms of heart failure. Her preprocedure labs discovered a new fairly severe microcytic anemia with a hemoglobin level of 8.   She does not get her blood checked routinely as she does not see a regular PCP. She subsequently underwent her cardiac catheterization last week which showed no significant obstructive coronary artery disease. Moderately elevated right-sided heart pressures: PA 48/17 with a mean of 31. Her wedge pressure was 22 mmHg, LVEDP was 21 mm Hg. She was subsequently admitted to the hospital to expedite her anemia workup. She was diuresed with IV Lasix, transfused with 2 units of packed red blood cells, and underwent an upper and lower endoscopy by GI which did not show any clear source of bleeding. She was diagnosed with iron deficiency anemia possibly due to chronic small bowel AVMs. She was discharged home and was feeling better initially. Her energy level was better, however, she has noted some increased leg swelling since returning home. She admits to taking all of her medications regularly. Prior to her heart catheterization, her Lasix was switched over to Bumex to see if that would improve her diuresis, however, she developed a diffuse maculopapular rash over her back. She denies any orthopnea or PND. Past Medical History:   Diagnosis Date    AI (aortic insufficiency)     Moderate to severe    Atrial fibrillation (HCC)     on Coumadin    Cardiac echocardiogram 11/08/2016    EF 50%. No WMA. Mild LVH. Gr 2 DDfx. Mild RVE. RVSP 43 mmHg. Severe LAE. Mild ARTI. Prosthetic MV w/normal fx. Mod-severe AI (mean grad 16 mmHg; prev 9 mmHg). Mod TR.  Cardiac Holter monitoring 09/29/2011    Baseline sinus rhythm w/ventricular tracking. Occasional premature ventricular ectopic beats likely correspond w/symptoms.     Complete heart block (HCC)     COPD (chronic obstructive pulmonary disease) ([address]     HTN (hypertension)     Long term (current) use of anticoagulants 8/19/2011    Mitral valve stenosis and aortic valve insufficiency     status post 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  Pacemaker     Medtronic dual-chamber       Current Outpatient Prescriptions   Medication Sig Dispense Refill    potassium chloride SR (K-TAB) 20 mEq tablet Take 1 Tab by mouth two (2) times a day. 90 Tab 3    furosemide (LASIX) 40 mg tablet Take 2 Tabs by mouth daily. 60 Tab 11    lisinopril (PRINIVIL, ZESTRIL) 5 mg tablet Take 1 Tab by mouth daily. 30 Tab 2    ascorbic acid, vitamin C, (VITAMIN C) 250 mg tablet Take 1 Tab by mouth daily (with breakfast). (Patient taking differently: Take 500 mg by mouth daily (with breakfast). ) 30 Tab 2    ferrous sulfate 325 mg (65 mg iron) tablet Take 1 Tab by mouth daily (with breakfast). 30 Tab 2    metoprolol succinate (TOPROL XL) 100 mg tablet Take 1 Tab by mouth daily. 90 Tab 3    amLODIPine (NORVASC) 5 mg tablet Take 1 Tab by mouth daily. 90 Tab 3    warfarin (COUMADIN) 6 mg tablet Take 1 Tab by mouth daily. or as directed 135 Tab 3    aspirin delayed-release 81 mg tablet Take 1 Tab by mouth daily. 90 Tab 3    vitamin E (AQUA GEMS) 400 unit capsule Take 400 Units by mouth daily. Allergies   Allergen Reactions    Bumex [Bumetanide] Itching        Social History   Substance Use Topics    Smoking status: Former Smoker     Packs/day: 0.50     Years: 30.00     Quit date: 8/19/1999    Smokeless tobacco: Never Used    Alcohol use No         Family History   Problem Relation Age of Onset    Heart Failure Mother     Heart Failure Father          Review of Systems   Constitutional: Negative for chills, fever and weight loss. HENT: Negative for nosebleeds. Eyes: Negative for blurred vision and double vision. Respiratory: Negative for cough, shortness of breath and wheezing. Cardiovascular: Positive for leg swelling. Negative for chest pain, palpitations, orthopnea, claudication and PND. Gastrointestinal: Negative for abdominal pain, heartburn, nausea and vomiting.    Genitourinary: Negative for dysuria and hematuria. Musculoskeletal: Negative for falls and myalgias. Skin: Negative for rash. Neurological: Negative for dizziness, focal weakness and headaches. Endo/Heme/Allergies: Does not bruise/bleed easily. Psychiatric/Behavioral: Negative for substance abuse. Visit Vitals    /60    Pulse 73    Ht 5' 4\" (1.626 m)    SpO2 97%      Physical Exam   Constitutional: She is oriented to person, place, and time. She appears well-developed and well-nourished. HENT:   Head: Normocephalic and atraumatic. Eyes: Conjunctivae are normal.   Neck: Neck supple. No JVD present. Carotid bruit is not present. Cardiovascular: Normal rate, regular rhythm, S1 normal, S2 normal and normal pulses. Exam reveals no gallop. Murmur heard. Midsystolic murmur is present with a grade of 2/6  at the upper right sternal border  High-pitched blowing decrescendo early diastolic murmur is present with a grade of 2/6  at the upper right sternal border radiating to the apex  Pulmonary/Chest: Effort normal. She has decreased breath sounds. She has no wheezes. She has no rales. Abdominal: Soft. Bowel sounds are normal. There is no tenderness. Musculoskeletal: She exhibits edema ( 1+ bilateral lower extremity in the ankles). She exhibits no tenderness or deformity. Neurological: She is alert and oriented to person, place, and time. Skin: Skin is warm and dry. Psychiatric: She has a normal mood and affect. Her behavior is normal. Thought content normal.     EKG:  Atrial fibrillation, occasional PVCs, demand ventricular pacing. Compared to the previous EKG, PVCs are now present. ASSESSMENT and PLAN    Acute systolic and diastolic heart failure. [facility] Her EF was 30-35% on a recent echocardiogram earlier this month. She still appears to be somewhat volume overloaded.   Her volume status did improve with IV Lasix in the hospital.  She may have a Bumex allergy, so this should be discontinued and I would like to start her on a higher dosage of Lasix at 80 mg twice daily. A Chem-7 channel will then be checked next week to see if any medications need to be added such as Aldactone. Nonischemic cardiomyopathy. EF now 30-35% which I suspect may be related to chronic valvular heart disease. She underwent a cardiac catheterization December 2017 which did not show any obstructive coronary disease. Rheumatic valvular heart disease. Status post mechanical [facility], prosthetic mitral valve replacement x 2, the last surgery in 2003. She also has residual aortic valve disease with mild stenosis and moderate to severe aortic insufficiency. A repeat echocardiogram in November 2017 demonstrated a normal functioning mitral valve mechanical prosthesis and moderate to severe aortic insufficiency. EF down to 30-35 %. Her goal INR is 3.0. She is not a candidate for a redo  open heart surgery. Persistent atrial fibrillation. The patient has remained in atrial fibrillation for the past 12 months she has never had any high rate episodes. She is relatively asymptomatic to the arrhythmias, although this may be contributing to her heart failure symptoms. It is highly unlikely that she will remain in sinus rhythm if a cardioversion is attempted. She will need to remain on anticoagulation indefinitely. Complete heart block. This occurs on her initial valve surgery. She is now pacemaker dependent in the ventricle. Normal device function on interrogation last month. Hypertension. Patient blood pressure is well controlled on her regimen which includes metoprolol and amlodipine. I would like the patient to return to the office in 6 weeks to see our nurse practitioner for her heart failure follow-up, then I will see her back in 3 months, sooner if needed.

## 2017-12-08 NOTE — PROGRESS NOTES
Verbal order and read back per Dr. Ani Pascual  The INR is below the therapeutic range. Please make the following adjustments to Coumadin dosing: Take Coumadin 6 mg daily and use Lovenox twice a day for 5 day until therapeutic. Repeat the INR on Monday.   Patient informed of instructions, read back and verbalized understanding Minoo Lowe LPN

## 2017-12-08 NOTE — MR AVS SNAPSHOT
Visit Information Date & Time Provider Department Dept. Phone Encounter #  
 12/8/2017 11:20 AM Danie Elder MD [facility] [id number] Upcoming Health Maintenance Date Due DTaP/Tdap/Td series (1 - Tdap) 1/2/1961 ZOSTER VACCINE AGE 60> 11/2/2001 GLAUCOMA SCREENING Q2Y 1/2/2007 OSTEOPOROSIS SCREENING (DEXA) 1/2/2007 Pneumococcal 65+ Low/Medium Risk (1 of 2 - PCV13) 1/2/2007 MEDICARE YEARLY EXAM 1/2/2007 Allergies as of 12/8/2017  Review Complete On: 12/3/2017 By: Juan Bee RN Severity Noted Reaction Type Reactions Bumex [Bumetanide]  12/01/2017    Itching Current Immunizations  Never Reviewed Name Date Influenza Vaccine (Quad) PF 12/3/2017  6:14 PM  
  
 Not reviewed this visit You Were Diagnosed With   
  
 Codes Comments Nonrheumatic aortic valve insufficiency    -  Primary ICD-10-CM: I35.1 ICD-9-CM: 424.1 Acute combined systolic and diastolic heart failure (HCC)     ICD-10-CM: I50.41 ICD-9-CM: 428.41 Essential hypertension     ICD-10-CM: I10 
ICD-9-CM: 401.9 Diastolic CHF, chronic (HCC)     ICD-10-CM: I50.32 
ICD-9-CM: 428.32, 428.0 Dyslipidemia     ICD-10-CM: E78.5 ICD-9-CM: 272.4 Chronic rheumatic heart disease     ICD-10-CM: I09.9 ICD-9-CM: 398.90 Complete heart block (HCC)     ICD-10-CM: I44.2 ICD-9-CM: 426.0 Typical atrial flutter (HCC)     ICD-10-CM: I48.3 ICD-9-CM: 427.32 Paroxysmal atrial fibrillation (HCC)     ICD-10-CM: I48.0 ICD-9-CM: 427.31 Mitral valve stenosis and aortic valve insufficiency     ICD-10-CM: I08.0 ICD-9-CM: 396.1 Pacemaker     ICD-10-CM: Z95.0 ICD-9-CM: V45.01 S/P MVR (mitral valve replacement)     ICD-10-CM: H99.4 ICD-9-CM: V43.3 Vitals BP Pulse Height(growth percentile) SpO2 OB Status Smoking Status 120/60 73 5' 4\" (1.626 m) 97% Postmenopausal Former Smoker Vitals History Preferred Pharmacy Pharmacy Name Phone [facility] [address] [phone] Your Updated Medication List  
  
   
This list is accurate as of: 12/8/17 12:13 PM.  Always use your most recent med list. amLODIPine 5 mg tablet Commonly known as:  Krishna Melvin Take 1 Tab by mouth daily. ascorbic acid (vitamin C) 250 mg tablet Commonly known as:  VITAMIN C Take 1 Tab by mouth daily (with breakfast). aspirin delayed-release 81 mg tablet Take 1 Tab by mouth daily. bumetanide 2 mg tablet Commonly known as:  Davidson Alexandru Take 1 Tab by mouth two (2) times a day. ferrous sulfate 325 mg (65 mg iron) tablet Take 1 Tab by mouth daily (with breakfast). lisinopril 5 mg tablet Commonly known as:  Penny Reasons Take 1 Tab by mouth daily. metoprolol succinate 100 mg tablet Commonly known as:  TOPROL XL Take 1 Tab by mouth daily. potassium chloride SR 20 mEq tablet Commonly known as:  K-TAB Take 2 Tabs by mouth two (2) times a day. vitamin E 400 unit capsule Commonly known as:  [address] Take 400 Units by mouth daily. warfarin 6 mg tablet Commonly known as:  COUMADIN Take 1 Tab by mouth daily. or as directed We Performed the Following AMB POC EKG ROUTINE W/ 12 LEADS, INTER & REP [53567 CPT(R)] To-Do List   
 12/15/2017 Lab:  METABOLIC PANEL, BASIC Patient Instructions Stop Bumex Start LAsix 80 mg BID Introducing [facility]! Dear Clarisse Olea: 
Thank you for requesting a [employer] account. Our records indicate that you already have an active [employer] account. You can access your account anytime at [email or web address] Did you know that you can access your hospital and ER discharge instructions at any time in [employer]? You can also review all of your test results from your hospital stay or ER visit. Additional Information If you have questions, please visit the Frequently Asked Questions section of the [employer] website at [email or web address] Remember, [employer] is NOT to be used for urgent needs. For medical emergencies, dial 911. Now available from your iPhone and Android! Please provide this summary of care documentation to your next provider. Your primary care clinician is listed as Noe Dawson. If you have any questions after today's visit, please call [phone].

## 2017-12-08 NOTE — TELEPHONE ENCOUNTER
Verbal order and read back per Juan Maya MD  The INR is below the therapeutic range.  Patient bridged with Lovenox for procedure on 12/1/17  Please make the following adjustments to Coumadin dosing: patient to use Lovenox twice a day for additional 5 days  Patient informed of instructions, read back and verbalized understanding Paul Bay LPN

## 2017-12-11 LAB — INR, EXTERNAL: 1.8

## 2017-12-12 ENCOUNTER — TELEPHONE ANTICOAG (OUTPATIENT)
Dept: CARDIOLOGY CLINIC | Age: 77
End: 2017-12-12

## 2017-12-12 DIAGNOSIS — Z79.01 LONG TERM CURRENT USE OF ANTICOAGULANT THERAPY: ICD-10-CM

## 2017-12-12 DIAGNOSIS — I08.0 MITRAL VALVE STENOSIS AND AORTIC VALVE INSUFFICIENCY: ICD-10-CM

## 2017-12-12 NOTE — PROGRESS NOTES
Verbal order and read back per Chance Smith NP  The INR is below the therapeutic range. Please make the following adjustments to Coumadin dosing:Take 9 mg today then Coumadin 6 mg daily and continue Lovenox twice a day. Patient has 2 doses of Lovenox remaining  Repeat the INR on Thursday.   Patient informed of instructions, read back and verbalized understanding Bindu Martinez LPN

## 2017-12-14 ENCOUNTER — TELEPHONE ANTICOAG (OUTPATIENT)
Dept: CARDIOLOGY CLINIC | Age: 77
End: 2017-12-14

## 2017-12-14 DIAGNOSIS — I08.0 MITRAL VALVE STENOSIS AND AORTIC VALVE INSUFFICIENCY: ICD-10-CM

## 2017-12-14 DIAGNOSIS — Z79.01 LONG TERM CURRENT USE OF ANTICOAGULANT THERAPY: ICD-10-CM

## 2017-12-14 LAB — INR, EXTERNAL: 2.2

## 2017-12-14 NOTE — PROGRESS NOTES
Verbal order and read back per Juana Cuevas NP  The INR is below the therapeutic range. Please make the following adjustments to Coumadin dosing: Take 9 mg today then continue Coumadin 6 mg daily except 3 mg on Monday  Repeat the INR in 1 week.   Patient informed of instructions, read back and verbalized understanding Jes Sales LPN

## 2017-12-28 ENCOUNTER — TELEPHONE ANTICOAG (OUTPATIENT)
Dept: CARDIOLOGY CLINIC | Age: 77
End: 2017-12-28

## 2017-12-28 DIAGNOSIS — Z79.01 LONG TERM CURRENT USE OF ANTICOAGULANT THERAPY: ICD-10-CM

## 2017-12-28 DIAGNOSIS — I08.0 MITRAL VALVE STENOSIS AND AORTIC VALVE INSUFFICIENCY: ICD-10-CM

## 2017-12-28 LAB — INR, EXTERNAL: 2.7

## 2018-01-17 LAB — INR, EXTERNAL: 2

## 2018-01-19 ENCOUNTER — TELEPHONE ANTICOAG (OUTPATIENT)
Dept: CARDIOLOGY CLINIC | Age: 78
End: 2018-01-19

## 2018-01-19 DIAGNOSIS — Z79.01 LONG TERM CURRENT USE OF ANTICOAGULANT THERAPY: ICD-10-CM

## 2018-01-19 DIAGNOSIS — I08.0 MITRAL VALVE STENOSIS AND AORTIC VALVE INSUFFICIENCY: ICD-10-CM

## 2018-01-19 NOTE — PROGRESS NOTES
Verbal order and read back per Nicolas Dorado NP  The INR is below the therapeutic range. Please make the following adjustments to Coumadin dosing: Take 9 mg today then Continue Coumadin 6 mg daily except 3 mg on Monday  Repeat the INR in 1 week. No answer, left instructions on voicemail and asked patient to call office to verify receipt of message.  Jeniffer Martínez LPN

## 2021-08-03 PROBLEM — I10 HTN (HYPERTENSION): Status: RESOLVED | Noted: 2021-08-03 | Resolved: 2021-08-03
